# Patient Record
Sex: FEMALE | Race: WHITE | HISPANIC OR LATINO | Employment: OTHER | URBAN - METROPOLITAN AREA
[De-identification: names, ages, dates, MRNs, and addresses within clinical notes are randomized per-mention and may not be internally consistent; named-entity substitution may affect disease eponyms.]

---

## 2017-01-03 ENCOUNTER — HOSPITAL ENCOUNTER (INPATIENT)
Facility: HOSPITAL | Age: 82
LOS: 1 days | Discharge: HOME HEALTH | DRG: 684 | End: 2017-01-04
Attending: EMERGENCY MEDICINE | Admitting: INTERNAL MEDICINE
Payer: MEDICARE

## 2017-01-03 DIAGNOSIS — N17.9 AKI (ACUTE KIDNEY INJURY): ICD-10-CM

## 2017-01-03 DIAGNOSIS — R55 SYNCOPE, UNSPECIFIED SYNCOPE TYPE: Primary | ICD-10-CM

## 2017-01-03 DIAGNOSIS — F03.91 DEMENTIA WITH BEHAVIORAL DISTURBANCE, UNSPECIFIED DEMENTIA TYPE: ICD-10-CM

## 2017-01-03 DIAGNOSIS — R29.898 MUSCULAR DECONDITIONING: ICD-10-CM

## 2017-01-03 LAB
ALBUMIN SERPL BCP-MCNC: 3.6 G/DL
ALP SERPL-CCNC: 119 U/L
ALT SERPL W/O P-5'-P-CCNC: 14 U/L
AMORPH CRY URNS QL MICRO: NORMAL
ANION GAP SERPL CALC-SCNC: 12 MMOL/L
AST SERPL-CCNC: 19 U/L
BACTERIA #/AREA URNS HPF: NORMAL /HPF
BASOPHILS # BLD AUTO: 0.03 K/UL
BASOPHILS NFR BLD: 0.3 %
BILIRUB SERPL-MCNC: 1.2 MG/DL
BILIRUB UR QL STRIP: ABNORMAL
BNP SERPL-MCNC: 44 PG/ML
BUN SERPL-MCNC: 30 MG/DL
CALCIUM SERPL-MCNC: 9.8 MG/DL
CHLORIDE SERPL-SCNC: 103 MMOL/L
CK SERPL-CCNC: 44 U/L
CLARITY UR: CLEAR
CO2 SERPL-SCNC: 24 MMOL/L
COLOR UR: ABNORMAL
CREAT SERPL-MCNC: 3 MG/DL
DIFFERENTIAL METHOD: ABNORMAL
EOSINOPHIL # BLD AUTO: 0.1 K/UL
EOSINOPHIL NFR BLD: 0.9 %
ERYTHROCYTE [DISTWIDTH] IN BLOOD BY AUTOMATED COUNT: 13.4 %
EST. GFR  (AFRICAN AMERICAN): 15 ML/MIN/1.73 M^2
EST. GFR  (NON AFRICAN AMERICAN): 13 ML/MIN/1.73 M^2
GLUCOSE SERPL-MCNC: 183 MG/DL
GLUCOSE UR QL STRIP: NEGATIVE
HCT VFR BLD AUTO: 39.4 %
HGB BLD-MCNC: 13.3 G/DL
HGB UR QL STRIP: ABNORMAL
HYALINE CASTS #/AREA URNS LPF: 0 /LPF
INR PPP: 1
KETONES UR QL STRIP: ABNORMAL
LEUKOCYTE ESTERASE UR QL STRIP: NEGATIVE
LYMPHOCYTES # BLD AUTO: 2.8 K/UL
LYMPHOCYTES NFR BLD: 23.6 %
MAGNESIUM SERPL-MCNC: 2.3 MG/DL
MCH RBC QN AUTO: 30.5 PG
MCHC RBC AUTO-ENTMCNC: 33.8 %
MCV RBC AUTO: 90 FL
MICROSCOPIC COMMENT: NORMAL
MONOCYTES # BLD AUTO: 0.9 K/UL
MONOCYTES NFR BLD: 7.3 %
NEUTROPHILS # BLD AUTO: 7.9 K/UL
NEUTROPHILS NFR BLD: 67 %
NITRITE UR QL STRIP: NEGATIVE
PH UR STRIP: 5 [PH] (ref 5–8)
PLATELET # BLD AUTO: 241 K/UL
PMV BLD AUTO: 11.3 FL
POTASSIUM SERPL-SCNC: 4.2 MMOL/L
PROT SERPL-MCNC: 7.4 G/DL
PROT UR QL STRIP: ABNORMAL
PROTHROMBIN TIME: 11 SEC
RBC # BLD AUTO: 4.36 M/UL
RBC #/AREA URNS HPF: 2 /HPF (ref 0–4)
SODIUM SERPL-SCNC: 139 MMOL/L
SP GR UR STRIP: >=1.03 (ref 1–1.03)
SQUAMOUS #/AREA URNS HPF: NORMAL /HPF
T4 FREE SERPL-MCNC: 1.31 NG/DL
TROPONIN I SERPL DL<=0.01 NG/ML-MCNC: 0.01 NG/ML
TROPONIN I SERPL DL<=0.01 NG/ML-MCNC: <0.006 NG/ML
TSH SERPL DL<=0.005 MIU/L-ACNC: 4.61 UIU/ML
URN SPEC COLLECT METH UR: ABNORMAL
UROBILINOGEN UR STRIP-ACNC: 1 EU/DL
WBC # BLD AUTO: 11.76 K/UL
WBC #/AREA URNS HPF: 2 /HPF (ref 0–5)

## 2017-01-03 PROCEDURE — 84540 ASSAY OF URINE/UREA-N: CPT

## 2017-01-03 PROCEDURE — 85025 COMPLETE CBC W/AUTO DIFF WBC: CPT

## 2017-01-03 PROCEDURE — 85610 PROTHROMBIN TIME: CPT

## 2017-01-03 PROCEDURE — 11000001 HC ACUTE MED/SURG PRIVATE ROOM

## 2017-01-03 PROCEDURE — 84300 ASSAY OF URINE SODIUM: CPT

## 2017-01-03 PROCEDURE — 25000003 PHARM REV CODE 250: Performed by: EMERGENCY MEDICINE

## 2017-01-03 PROCEDURE — 93005 ELECTROCARDIOGRAM TRACING: CPT

## 2017-01-03 PROCEDURE — 82550 ASSAY OF CK (CPK): CPT

## 2017-01-03 PROCEDURE — 25000003 PHARM REV CODE 250: Performed by: INTERNAL MEDICINE

## 2017-01-03 PROCEDURE — 84484 ASSAY OF TROPONIN QUANT: CPT

## 2017-01-03 PROCEDURE — 84439 ASSAY OF FREE THYROXINE: CPT

## 2017-01-03 PROCEDURE — 99285 EMERGENCY DEPT VISIT HI MDM: CPT

## 2017-01-03 PROCEDURE — 96360 HYDRATION IV INFUSION INIT: CPT

## 2017-01-03 PROCEDURE — 82570 ASSAY OF URINE CREATININE: CPT

## 2017-01-03 PROCEDURE — 83880 ASSAY OF NATRIURETIC PEPTIDE: CPT

## 2017-01-03 PROCEDURE — 81000 URINALYSIS NONAUTO W/SCOPE: CPT

## 2017-01-03 PROCEDURE — 80053 COMPREHEN METABOLIC PANEL: CPT

## 2017-01-03 PROCEDURE — 36415 COLL VENOUS BLD VENIPUNCTURE: CPT

## 2017-01-03 PROCEDURE — 84484 ASSAY OF TROPONIN QUANT: CPT | Mod: 91

## 2017-01-03 PROCEDURE — 27000221 HC OXYGEN, UP TO 24 HOURS

## 2017-01-03 PROCEDURE — 84443 ASSAY THYROID STIM HORMONE: CPT

## 2017-01-03 PROCEDURE — 94761 N-INVAS EAR/PLS OXIMETRY MLT: CPT

## 2017-01-03 PROCEDURE — 83735 ASSAY OF MAGNESIUM: CPT

## 2017-01-03 RX ORDER — SODIUM CHLORIDE 9 MG/ML
INJECTION, SOLUTION INTRAVENOUS CONTINUOUS
Status: DISCONTINUED | OUTPATIENT
Start: 2017-01-03 | End: 2017-01-04 | Stop reason: HOSPADM

## 2017-01-03 RX ORDER — LACTULOSE 10 G/15ML
SOLUTION ORAL; RECTAL 3 TIMES DAILY
COMMUNITY
End: 2018-05-31

## 2017-01-03 RX ORDER — PANTOPRAZOLE SODIUM 40 MG/1
40 TABLET, DELAYED RELEASE ORAL DAILY
Status: DISCONTINUED | OUTPATIENT
Start: 2017-01-04 | End: 2017-01-04 | Stop reason: HOSPADM

## 2017-01-03 RX ORDER — HEPARIN SODIUM 5000 [USP'U]/ML
5000 INJECTION, SOLUTION INTRAVENOUS; SUBCUTANEOUS EVERY 12 HOURS
Status: DISCONTINUED | OUTPATIENT
Start: 2017-01-03 | End: 2017-01-04 | Stop reason: HOSPADM

## 2017-01-03 RX ADMIN — SODIUM CHLORIDE 1000 ML: 0.9 INJECTION, SOLUTION INTRAVENOUS at 03:01

## 2017-01-03 RX ADMIN — SODIUM CHLORIDE: 0.9 INJECTION, SOLUTION INTRAVENOUS at 08:01

## 2017-01-03 NOTE — ED NOTES
89 year old female presents to ed with chief complaint of loc. Patients family acts as historians and reports patient has hx of dementia. Patients daughter reports mother got up from toilet began to walk and felt dizzy resulting in syncopal episode. Patient denies any pain at this time

## 2017-01-03 NOTE — IP AVS SNAPSHOT
[facility]  [address]  [place] [address]  Phone: [phone]           Patient Discharge Instructions     Our goal is to set you up for success. This packet includes information on your condition, medications, and your home care. It will help you to care for yourself so you don't get sicker and need to go back to the hospital.     Please ask your nurse if you have any questions.        There are many details to remember when preparing to leave the hospital. Here is what you will need to do:    1. Take your medicine. If you are prescribed medications, review your Medication List in the following pages. You may have new medications to  at the pharmacy and others that you'll need to stop taking. Review the instructions for how and when to take your medications. Talk with your doctor or nurses if you are unsure of what to do.     2. Go to your follow-up appointments. Specific follow-up information is listed in the following pages. Your may be contacted by a transition nurse or clinical provider about future appointments. Be sure we have all of the phone numbers to reach you, if needed. Please contact your provider's office if you are unable to make an appointment.     3. Watch for warning signs. Your doctor or nurse will give you detailed warning signs to watch for and when to call for assistance. These instructions may also include educational information about your condition. If you experience any of warning signs to your health, call your doctor.               ** Verify the list of medication(s) below is accurate and up to date. Carry this with you in case of emergency. If your medications have changed, please notify your healthcare provider.             Medication List      CONTINUE taking these medications        Additional Info                      ibuprofen 200 MG tablet   Commonly known as:  ADVIL,MOTRIN   Refills:  0   Dose:  200 mg    Instructions:  Take 200 mg by mouth every 8  (eight) hours as needed for Pain.     Begin Date    AM    Noon    PM    Bedtime       lactulose 10 gram/15 mL solution   Commonly known as:  CHRONULAC   Refills:  0    Instructions:  Take by mouth 3 (three) times daily.     Begin Date    AM    Noon    PM    Bedtime       loratadine 10 mg tablet   Commonly known as:  CLARITIN   Refills:  0   Dose:  10 mg    Instructions:  Take 10 mg by mouth once daily.     Begin Date    AM    Noon    PM    Bedtime       omeprazole 40 MG capsule   Commonly known as:  PRILOSEC   Quantity:  90 capsule   Refills:  3   Dose:  40 mg    Instructions:  Take 1 capsule (40 mg total) by mouth before breakfast. 1 Capsule, Delayed Release(E.C.) Oral Every day.  30 minutes before breakfast     Begin Date    AM    Noon    PM    Bedtime         STOP taking these medications     alendronate 35 MG tablet   Commonly known as:  FOSAMAX                  Please bring to all follow up appointments:    1. A copy of your discharge instructions.  2. All medicines you are currently taking in their original bottles.  3. Identification and insurance card.    Please arrive 15 minutes ahead of scheduled appointment time.    Please call 24 hours in advance if you must reschedule your appointment and/or time.        Your Scheduled Appointments     Jan 23, 2017  2:40 PM CST   Established Patient Visit with Forest Angeles MD   [facility]    [address]  [place] [address]   [phone]            May 25, 2017  9:00 AM CDT   Established Patient Visit with Forest Angeles MD   [facility]    [address]  [place] [address]   [phone]              Follow-up Information     Follow up with Forest Angeles MD. Go on 1/23/2017.    Specialty:  Family Medicine    Why:  @2:40pm    Contact information:    [address] [place] [address]  [phone]        Referrals     Future Orders    Referral to Home health         Discharge  Instructions     Future Orders    Activity as tolerated     Call MD for:  difficulty breathing or increased cough     Call MD for:  increased confusion or weakness     Call MD for:  persistent dizziness, light-headedness, or visual disturbances     Call MD for:  persistent nausea and vomiting or diarrhea     Call MD for:  redness, tenderness, or signs of infection (pain, swelling, redness, odor or green/yellow discharge around incision site)     Call MD for:  severe persistent headache     Call MD for:  severe uncontrolled pain     Call MD for:  temperature >100.4     Call MD for:  worsening rash     Diet general     Comments:    Pureed and soft food.    Questions:    Total calories:      Fat restriction, if any:      Protein restriction, if any:      Na restriction, if any:      Fluid restriction:      Additional restrictions:          Discharge Instructions       DEHYDRATION (ADULT) (ENGLISH) View Edit Remove  DEHYDRATION (ENGLISH) View Edit Remove  DEHYDRATION, PREVENTING (CHILD) (ENGLISH) View Edit Remove  DEMENTIA PATIENTS, DAILY CARE FOR: FOR CAREGIVERS (ENGLISH) View Edit Remove  DEMENTIA PATIENTS, SAFETY TIPS FOR: FOR CAREGIVERS (ENGLISH) View Edit Remove  DEMENTIA, ANY TYPE, CAREGIVER SUPPORT (ENGLISH) View Edit Remove  DEMENTIA: COPING TIPS FOR CAREGIVERS (ENGLISH) View Edit Remove  HEART FAILURE, DISCHARGE INSTRUCTIONS FOR (ENGLISH) View Edit Remove  HEART FAILURE, WHAT IS (ENGLISH) View Edit Remove  HEART FAILURE: PROCEDURES THAT MAY HELP (ENGLISH) View Edit Remove  HEART FAILURE: TAKING MEDICATION TO CONTROL (ENGLISH) View Edit Remove  HEART FAILURE: TRACKING YOUR WEIGHT (ENGLISH) View Edit Remove  HEART FAILURE: WARNING SIGNS OF A FLARE-UP (ENGLISH) View Edit Remove        Discharge References/Attachments     DEHYDRATION (ADULT) (ENGLISH)    DEHYDRATION (ENGLISH)    DEHYDRATION, PREVENTING (CHILD) (ENGLISH)    DEMENTIA PATIENTS, DAILY CARE FOR: FOR CAREGIVERS (ENGLISH)    DEMENTIA PATIENTS, SAFETY TIPS  "FOR: FOR CAREGIVERS (ENGLISH)    DEMENTIA, ANY TYPE, CAREGIVER SUPPORT (ENGLISH)    DEMENTIA: COPING TIPS FOR CAREGIVERS (ENGLISH)    HEART FAILURE, DISCHARGE INSTRUCTIONS FOR (ENGLISH)    HEART FAILURE, WHAT IS (ENGLISH)    HEART FAILURE: PROCEDURES THAT MAY HELP (ENGLISH)    HEART FAILURE: TAKING MEDICATION TO CONTROL  (ENGLISH)    HEART FAILURE: TRACKING YOUR WEIGHT (ENGLISH)    HEART FAILURE: WARNING SIGNS OF A FLARE-UP (ENGLISH)        Primary Diagnosis     Your primary diagnosis was:  Acute Nontraumatic Kidney Injury      Admission Information     Date & Time Provider Department CSN    1/3/2017  1:35 PM Jeremy Wall MD [facility] [id number]      Care Providers     Provider Role Specialty Primary office phone    Jeremy Wall MD Attending Provider Internal Medicine [phone]      Your Vitals Were     BP Pulse Temp Resp Height Weight    131/84 (BP Location: Right arm, Patient Position: Lying, BP Method: Automatic) 78 99.1 °F (37.3 °C) (Axillary) 18 4' 5" (1.346 m) 59 kg (130 lb)    SpO2 BMI             98% 32.54 kg/m2         Recent Lab Values     No lab values to display.      Pending Labs     Order Current Status    Creatinine, urine, random In process    Sodium, urine, random In process    Urea nitrogen, urine In process      Allergies as of 1/4/2017        Reactions    Penicillins     Other reaction(s): Rash      [facility]     [facility] Nurse Care Line - 24/7 Assistance  Unless otherwise directed by your provider, please contact [facility], our nurse care line that is available for 24/7 assistance.     Registered nurses in the [facility] provide clinical advisement, health education, appointment booking, and other advisory services.  Call for this free service at [phone].        Advance Directives     An advance directive is a document which, in the event you are no longer able to make decisions for yourself, tells your healthcare team what " kind of treatment you do or do not want to receive, or who you would like to make those decisions for you.  If you do not currently have an advance directive, [facility] encourages you to create one.  For more information call:  [phone]), [phone] ([phone]),  or log on to [email or web address].        Language Assistance Services     ATTENTION: Language assistance services are available, free of charge. Please call [phone].      ATENCIÓN: Si habla español, tiene a vale disposición servicios gratuitos de asistencia lingüística. Llame al [phone].     CHÚ Ý: N?u b?n nói Ti?ng Vi?t, có các d?ch v? h? tr? ngôn ng? mi?n phí dành cho b?n. G?i s? [phone].        [facility] Sign-Up     Activating your [facility] account is as easy as 1-2-3!     1) Visit [email or web address], select Sign Up Now, enter this activation code and your date of birth, then select Next.  [email or web address]  Expires: 1/9/2017 10:02 AM      2) Create a username and password to use when you visit [facility] in the future and select a security question in case you lose your password and select Next.    3) Enter your e-mail address and click Sign Up!    Additional Information  If you have questions, please e-mail [email or web address] or call [phone] to talk to our [facility] staff. Remember, [facility] is NOT to be used for urgent needs. For medical emergencies, dial 911.          [facility] complies with applicable Federal civil rights laws and does not discriminate on the basis of race, color, national origin, age, disability, or sex.

## 2017-01-03 NOTE — ED NOTES
APPEARANCE: Alert, oriented and in no acute distress.     PERIPHERAL VASCULAR: peripheral pulses present. Normal cap refill. No edema. Warm to touch.    RESPIRATORY:Normal rate and effort, breath sounds clear bilaterally throughout chest. Respirations are equal and unlabored no obvious signs of distress.  GASTRO: soft, bowel sounds normal, no tenderness, no abdominal distention.  MUSC: Full ROM. No bony tenderness or soft tissue tenderness. No obvious deformity.  SKIN: Skin is warm and dry, normal skin turgor, mucous membranes moist.  MENTAL STATUS: awake, alert and aware of environment.  EYE: PERRL, both eyes: pupils brisk and reactive to light. Normal size.  ENT: EARS: no obvious drainage. NOSE: no active bleeding.

## 2017-01-03 NOTE — IP AVS SNAPSHOT
[facility]  [address]  [place] [address]  Phone: [phone]           I have received a copy of my After Visit Summary and discharge instructions from [facility].    INSTRUCTIONS RECEIVED AND UNDERSTOOD BY:                     Patient/Patient Representative: ________________________________________________________________     Date/Time: ________________________________________________________________                     Instructions Given By: ________________________________________________________________     Date/Time: ________________________________________________________________

## 2017-01-03 NOTE — ED PROVIDER NOTES
Encounter Date: 1/3/2017       History     Chief Complaint   Patient presents with    Loss of Consciousness     s/p vagal response while attempting to have bowel movement; family states loc occurred while in sons arms; denies injury; hx of dementia;      Review of patient's allergies indicates:   Allergen Reactions    Penicillins      Other reaction(s): Rash     HPI Comments: Patient is an 89-year-old female brought in by family members who say the patient had a syncopal episode while being walked back from the bathroom.  She was caught by a family member and did not fall to the floor.  They're unsure as to how long she was out for.  She did not stop breathing.  Patient awoke at her usual time this morning and had breakfast.  She had no complaints prior to this occurrence.  She has no prior history of syncopal episodes.    History provided by: family members.     Past Medical History   Diagnosis Date    DEMENTIA     Hypertension     Osteoporosis      No past medical history pertinent negatives.  History reviewed. No pertinent past surgical history.  Family History   Problem Relation Age of Onset    Melanoma Neg Hx      Social History   Substance Use Topics    Smoking status: Never Smoker    Smokeless tobacco: Never Used    Alcohol use No     Review of Systems   Constitutional: Negative for fever.   Respiratory: Negative for shortness of breath.    Gastrointestinal: Negative for abdominal pain, diarrhea and vomiting.   Neurological: Positive for syncope.   All other systems reviewed and are negative.      Physical Exam   Initial Vitals   BP Pulse Resp Temp SpO2   01/03/17 1333 01/03/17 1333 01/03/17 1333 01/03/17 1402 01/03/17 1333   126/96 70 16 98.7 °F (37.1 °C) 99 %     Physical Exam    Nursing note and vitals reviewed.  Constitutional: No distress.   HENT:   Head: Normocephalic and atraumatic.   Eyes: EOM are normal. Pupils are equal, round, and reactive to light.   Neck: Neck supple.   Cardiovascular:    MAGGIE.   Pulmonary/Chest: Breath sounds normal.   Abdominal: Soft. She exhibits no distension. There is no tenderness.   Musculoskeletal: Normal range of motion.   Neurological: She is alert.   Skin: Skin is warm and dry.         ED Course   Procedures  Labs Reviewed   CBC W/ AUTO DIFFERENTIAL - Abnormal; Notable for the following:        Result Value    Gran # 7.9 (*)     All other components within normal limits   COMPREHENSIVE METABOLIC PANEL - Abnormal; Notable for the following:     Glucose 183 (*)     BUN, Bld 30 (*)     Creatinine 3.0 (*)     Total Bilirubin 1.2 (*)     eGFR if  15 (*)     eGFR if non  13 (*)     All other components within normal limits   TSH - Abnormal; Notable for the following:     TSH 4.614 (*)     All other components within normal limits   CK   TROPONIN I   PROTIME-INR   B-TYPE NATRIURETIC PEPTIDE   MAGNESIUM   T4, FREE   URINALYSIS   URINALYSIS MICROSCOPIC     Imaging Results         X-Ray Chest 1 View (Final result) Result time:  01/03/17 16:34:01    Final result by Travis Byers MD (01/03/17 16:34:01)    Impression:        No acute cardiothoracic disease.      Electronically signed by: TRAVIS BYERS MD  Date:     01/03/17  Time:    16:34     Narrative:    PORTABLE AP CHEST:      Comparison: None.    Findings:     The lungs are clear. There is no pleural effusion or pneumothorax. The cardiac silhouette isnormal in size. Atherosclerotic calcification and ectasia of the aortic arch.  There are no acute bony abnormalities.            CT Head Without Contrast (Final result) Result time:  01/03/17 15:06:40    Final result by Travis Byers MD (01/03/17 15:06:40)    Impression:       No acute intracranial abnormality.    Parenchymal volume loss and sequelae of chronic small vessel disease, slightly progressed from prior exam.      Electronically signed by: TRAVIS BYERS MD  Date:     01/03/17  Time:    15:06     Narrative:    CT head without  contrast    Clinical indication: Syncope    Comparison: 6/3/11    Technique: 5-mm axial images of the head were performed without the administration of contrast.  Sagittal and coronal reformatted images were also obtained.    Findings:  No intracranial blood products.  No evidence for acute large territory infarction. No evidence of parenchymal mass.  No extra-axial masses or abnormal fluid collections are noted.    The diffuse parenchymal volume loss with ex vacuo ventricular dilatation is again noted, slightly progressed from prior. [place] periventricular and subcortical white matter hypodensities are noted bilaterally, sequelae of chronic small vessel disease. Symmetric bilateral basal ganglia and pontine calcifications are again noted and are unchanged.    The visualized paranasal sinuses and mastoid air cells are clear.  The osseous structures show no evidence of fracture.  The surrounding soft tissues are unremarkable.                EKG Readings: (Independently Interpreted)   Rhythm: Normal Sinus Rhythm. Heart Rate: 76. Ectopy: No Ectopy. Conduction: Normal. ST Segments: Normal ST Segments. T Waves: Normal. Clinical Impression: Normal Sinus Rhythm                            ED Course     Clinical Impression:   The primary encounter diagnosis was Syncope, unspecified syncope type. A diagnosis of ANA (acute kidney injury) was also pertinent to this visit.          Yadiel Carter MD  01/03/17 [id number]

## 2017-01-04 VITALS
DIASTOLIC BLOOD PRESSURE: 84 MMHG | BODY MASS INDEX: 30.09 KG/M2 | SYSTOLIC BLOOD PRESSURE: 131 MMHG | HEIGHT: 55 IN | OXYGEN SATURATION: 98 % | HEART RATE: 78 BPM | WEIGHT: 130 LBS | RESPIRATION RATE: 18 BRPM | TEMPERATURE: 99 F

## 2017-01-04 LAB
ALBUMIN SERPL BCP-MCNC: 3.1 G/DL
ALP SERPL-CCNC: 101 U/L
ALT SERPL W/O P-5'-P-CCNC: 13 U/L
ANION GAP SERPL CALC-SCNC: 9 MMOL/L
AORTIC VALVE REGURGITATION: ABNORMAL
AST SERPL-CCNC: 24 U/L
BASOPHILS # BLD AUTO: 0.02 K/UL
BASOPHILS NFR BLD: 0.3 %
BILIRUB SERPL-MCNC: 1.1 MG/DL
BUN SERPL-MCNC: 22 MG/DL
CALCIUM SERPL-MCNC: 8.9 MG/DL
CHLORIDE SERPL-SCNC: 108 MMOL/L
CO2 SERPL-SCNC: 21 MMOL/L
CREAT SERPL-MCNC: 1.2 MG/DL
CREAT UR-MCNC: 279.4 MG/DL
DIASTOLIC DYSFUNCTION: YES
DIFFERENTIAL METHOD: NORMAL
EOSINOPHIL # BLD AUTO: 0.1 K/UL
EOSINOPHIL NFR BLD: 1.1 %
ERYTHROCYTE [DISTWIDTH] IN BLOOD BY AUTOMATED COUNT: 13.5 %
EST. GFR  (AFRICAN AMERICAN): 46 ML/MIN/1.73 M^2
EST. GFR  (NON AFRICAN AMERICAN): 40 ML/MIN/1.73 M^2
ESTIMATED PA SYSTOLIC PRESSURE: 20.47
GLUCOSE SERPL-MCNC: 60 MG/DL
HCT VFR BLD AUTO: 37.8 %
HGB BLD-MCNC: 12.6 G/DL
LYMPHOCYTES # BLD AUTO: 2.2 K/UL
LYMPHOCYTES NFR BLD: 30.3 %
MCH RBC QN AUTO: 30 PG
MCHC RBC AUTO-ENTMCNC: 33.3 %
MCV RBC AUTO: 90 FL
MONOCYTES # BLD AUTO: 0.8 K/UL
MONOCYTES NFR BLD: 10.6 %
NEUTROPHILS # BLD AUTO: 4.2 K/UL
NEUTROPHILS NFR BLD: 57.3 %
PLATELET # BLD AUTO: 205 K/UL
PMV BLD AUTO: 11.4 FL
POTASSIUM SERPL-SCNC: 4.4 MMOL/L
PROT SERPL-MCNC: 7.2 G/DL
RBC # BLD AUTO: 4.2 M/UL
RETIRED EF AND QEF - SEE NOTES: 55 (ref 55–65)
SODIUM SERPL-SCNC: 138 MMOL/L
SODIUM UR-SCNC: 44 MMOL/L
TROPONIN I SERPL DL<=0.01 NG/ML-MCNC: 0.01 NG/ML
UUN UR-MCNC: 314 MG/DL
WBC # BLD AUTO: 7.29 K/UL

## 2017-01-04 PROCEDURE — 97165 OT EVAL LOW COMPLEX 30 MIN: CPT

## 2017-01-04 PROCEDURE — 94761 N-INVAS EAR/PLS OXIMETRY MLT: CPT

## 2017-01-04 PROCEDURE — 84484 ASSAY OF TROPONIN QUANT: CPT

## 2017-01-04 PROCEDURE — G8996 SWALLOW CURRENT STATUS: HCPCS | Mod: CI

## 2017-01-04 PROCEDURE — 97116 GAIT TRAINING THERAPY: CPT

## 2017-01-04 PROCEDURE — 80053 COMPREHEN METABOLIC PANEL: CPT

## 2017-01-04 PROCEDURE — 92610 EVALUATE SWALLOWING FUNCTION: CPT

## 2017-01-04 PROCEDURE — 97530 THERAPEUTIC ACTIVITIES: CPT

## 2017-01-04 PROCEDURE — G8988 SELF CARE GOAL STATUS: HCPCS | Mod: CN

## 2017-01-04 PROCEDURE — 94760 N-INVAS EAR/PLS OXIMETRY 1: CPT

## 2017-01-04 PROCEDURE — G8979 MOBILITY GOAL STATUS: HCPCS | Mod: CJ

## 2017-01-04 PROCEDURE — 25000003 PHARM REV CODE 250: Performed by: INTERNAL MEDICINE

## 2017-01-04 PROCEDURE — G8989 SELF CARE D/C STATUS: HCPCS | Mod: CN

## 2017-01-04 PROCEDURE — G8997 SWALLOW GOAL STATUS: HCPCS | Mod: CI

## 2017-01-04 PROCEDURE — 97161 PT EVAL LOW COMPLEX 20 MIN: CPT

## 2017-01-04 PROCEDURE — 85025 COMPLETE CBC W/AUTO DIFF WBC: CPT

## 2017-01-04 PROCEDURE — G8978 MOBILITY CURRENT STATUS: HCPCS | Mod: CK

## 2017-01-04 PROCEDURE — 96374 THER/PROPH/DIAG INJ IV PUSH: CPT

## 2017-01-04 PROCEDURE — G8998 SWALLOW D/C STATUS: HCPCS | Mod: CI

## 2017-01-04 PROCEDURE — G8987 SELF CARE CURRENT STATUS: HCPCS | Mod: CN

## 2017-01-04 RX ORDER — DOCUSATE SODIUM 100 MG/1
100 CAPSULE, LIQUID FILLED ORAL 2 TIMES DAILY
Status: DISCONTINUED | OUTPATIENT
Start: 2017-01-04 | End: 2017-01-04 | Stop reason: HOSPADM

## 2017-01-04 RX ADMIN — SODIUM CHLORIDE: 0.9 INJECTION, SOLUTION INTRAVENOUS at 05:01

## 2017-01-04 NOTE — PLAN OF CARE
Problem: Occupational Therapy Goal  Goal: Occupational Therapy Goal  Outcome: Outcome(s) achieved Date Met:  01/04/17  Evaluation completed.  OT recs home with family; OT HH evaluation for home assessment and recs; DME: TTB. No skilled acute OT required secondary to patient ADLs at baseline.

## 2017-01-04 NOTE — PLAN OF CARE
Patient discharged to home, discharge instruction given to the patient and the family member, verbalized understanding, refer to clinical reference for education, IV removed, tip intact, patient tolerated well, awaiting transport.

## 2017-01-04 NOTE — PLAN OF CARE
Problem: Patient Care Overview  Goal: Plan of Care Review  Outcome: Ongoing (interventions implemented as appropriate)  Pt on 1 lpm NC. SpO2 99%. Will continue to monitor.

## 2017-01-04 NOTE — PLAN OF CARE
Problem: Physical Therapy Goal  Goal: Physical Therapy Goal  Goals to be met by: 17     Patient will increase functional independence with mobility by performin. Supine to sit with Modified [place]  2. Sit to supine with Modified [place]  3. Sit to stand transfer with Modified [place] and Supervision  4. Gait x 100 feet with Stand-by Assistance without AD without LOB    Recommend discharge to return to home setting with 24 hr family caregivers and home health with PT and OT.  Outcome: Ongoing (interventions implemented as appropriate)  Recommend discharge to return to home setting with 24 hr family caregivers and home health with PT and OT.  Will continue to address pt's current deficits in endurance and balance with mobility tasks while in hospital with man cues/guidance.  Pt demonstrated decreased time in stance phase on LLE and pained grimace during gait training which family reports is a new symptom. Family reports of no known falls or injuries.  Reported to ns.

## 2017-01-04 NOTE — PLAN OF CARE
Patient arrived on unit via stretcher from ED. Moved to patient bed with four assist. Family at bedside. Will continue to monitor.

## 2017-01-04 NOTE — DISCHARGE INSTRUCTIONS
DEHYDRATION (ADULT) (ENGLISH) View Edit Remove  DEHYDRATION (ENGLISH) View Edit Remove  DEHYDRATION, PREVENTING (CHILD) (ENGLISH) View Edit Remove  DEMENTIA PATIENTS, DAILY CARE FOR: FOR CAREGIVERS (ENGLISH) View Edit Remove  DEMENTIA PATIENTS, SAFETY TIPS FOR: FOR CAREGIVERS (ENGLISH) View Edit Remove  DEMENTIA, ANY TYPE, CAREGIVER SUPPORT (ENGLISH) View Edit Remove  DEMENTIA: COPING TIPS FOR CAREGIVERS (ENGLISH) View Edit Remove  HEART FAILURE, DISCHARGE INSTRUCTIONS FOR (ENGLISH) View Edit Remove  HEART FAILURE, WHAT IS (ENGLISH) View Edit Remove  HEART FAILURE: PROCEDURES THAT MAY HELP (ENGLISH) View Edit Remove  HEART FAILURE: TAKING MEDICATION TO CONTROL (ENGLISH) View Edit Remove  HEART FAILURE: TRACKING YOUR WEIGHT (ENGLISH) View Edit Remove  HEART FAILURE: WARNING SIGNS OF A FLARE-UP (ENGLISH) View Edit Remove

## 2017-01-04 NOTE — PT/OT/SLP EVAL
"Occupational Therapy  Evaluation    Moira Salter   MRN: [id number]   Admitting Diagnosis: ANA    OT Date of Treatment: 17   OT Start Time: 1043  OT Stop Time: 1115  OT Total Time (min): 32 min    Billable Minutes:  Evaluation 32 (co-evaluation with PHENNA)    Diagnosis: ANA, syncopal episode      Past Medical History   Diagnosis Date    DEMENTIA     Hypertension     Osteoporosis       History reviewed. No pertinent past surgical history.    Referring physician: Dr. Araya  Date referred to OT: 1/3/2017    General Precautions: Standard, aspiration, fall  Orthopedic Precautions: N/A  Braces: N/A          Patient History:  Living Environment  Lives With: child(lisa), adult  Living Arrangements: house  Home Accessibility: stairs to enter home  Home Layout: Able to live on 1st floor  Number of Stairs to Enter Home: 5  Stair Railings at Home: outside, present at both sides  Transportation Available: car, family or friend will provide  Living Environment Comment:  (Patient requires assist with all ADLs and functional mobility secondary to patient did not initiate ADLs PTA except feeding secondary to advanced dementia.)  Equipment Currently Used at Home: none    Prior level of function:   Bed Mobility/Transfers: needs assist  Grooming: needs assist  Bathing: needs assist  Upper Body Dressing: needs assist  Lower Body Dressing: needs assist  Toileting: needs assist  Home Management Skills: needs assist  Homemaking Responsibilities: No  Driving License: No  Mode of Transportation: Car, Family     Dominant hand: right    Subjective:  Communicated with nurse prior to session.    Chief Complaint: Daughter states "decreased appetite"  Patient/Family stated goals: Increase food intake    Pain Ratin/10  Location - Side: Left     Location: hip  Pain Addressed: Cessation of Activity, Reposition  Pain Rating Post-Intervention: 0/10    Objective:  Patient found with: peripheral IV, telemetry, oxygen    Cognitive Exam:  Oriented " to: Person  Follows Commands/attention: Inattentive and Easily distracted  Communication: clear but inappropriate answers  Memory:  Impaired STM, Impaired LTM and Poor immediate recall  Safety awareness/insight to disability: impaired  Coping skills/emotional control: Appropriate to situation    Visual/perceptual:  Intact    Physical Exam:  Postural examination/scapula alignment: Head forward  Skin integrity: Thin  Edema: None noted     Sensation:   Intact    Upper Extremity Range of Motion:  Right Upper Extremity: WFL   Left Upper Extremity: WFL    Upper Extremity Strength:  Right Upper Extremity: WFL (not able to formal assessed but patient able to functional use UEs)  Left Upper Extremity: WFL (not able to formal assessed but patient able to functional use UEs)   Strength: Fair    Fine motor coordination:   Intact    Gross motor coordination: WFL    Functional Mobility:  Bed Mobility:  Scooting/Bridging: Minimum Assistance  Supine to Sit: Minimum Assistance, With assist of 2  Sit to Supine: Minimum Assistance, With assist of 2    Transfers:  Sit <> Stand Assistance: Minimum Assistance  Sit <> Stand Assistive Device: No Assistive Device    Functional Ambulation: Min A for LOB, and functional mobility.    Activities of Daily Living:  Feeding Level of Assistance: Total assistance  UE Dressing Level of Assistance: Total assistance  LE Dressing Level of Assistance: Total assistance     Grooming Level of Assistance: Total assistance  Toileting Where Assessed: Bed level  Toileting Level of Assistance: Total assistance        Balance:   Static Sit: GOOD+: Takes MAXIMAL challenges from all directions.    Dynamic Sit: GOOD-: Maintains balance through MODERATE excursions of active trunk movement,     Static Stand: FAIR: Maintains without assist but unable to take challenges  Dynamic stand: FAIR: Needs CONTACT GUARD during gait      AM-PAC 6 CLICK ADL  How much help from another person does this patient currently  "need?  1 = Unable, Total/Dependent Assistance  2 = A lot, Maximum/Moderate Assistance  3 = A little, Minimum/Contact Guard/Supervision  4 = None, Modified [place]/Independent    Putting on and taking off regular lower body clothing? : 1  Bathing (including washing, rinsing, drying)?: 1  Toileting, which includes using toilet, bedpan, or urinal? : 1  Putting on and taking off regular upper body clothing?: 1  Taking care of personal grooming such as brushing teeth?: 1  Eating meals?: 1  Total Score: 6    AM-PAC Raw Score CMS "G-Code Modifier Level of Impairment Assistance   6 % Total / Unable   7 - 9 CM 80 - 100% Maximal Assist   10 - 14 CL 60 - 80% Moderate Assist   15 - 19 CK 40 - 60% Moderate Assist   20 - 22 CJ 20 - 40% Minimal Assist   23 CI 1-20% SBA / CGA   24 CH 0% Independent/ Mod I       Patient left supine with all lines intact, call button in reach, bed alarm on and family present    Assessment:  Moira Salter is a 89 y.o. female with a medical diagnosis of ANA and presents with decreased balance, strength and pain affecting functional mobility.  Patient only engages in purposeful functional mobility with family, and feeding.  However, since patient has a decreased appetite she does not engage in feeding.  Patient requires total assist with ADLs which is close to her baseline.  AE, DME, and HH OT assessment recommended to decrease caregiver burden.    Rehab identified problem list/impairments: Rehab identified problem list/impairments: weakness, impaired self care skills, impaired functional mobilty, gait instability, impaired balance, impaired cognition, decreased safety awareness    Rehab potential is good.    Activity tolerance: Good    Discharge recommendations: Discharge Facility/Level Of Care Needs: home with home health     Barriers to discharge: Barriers to Discharge: None    Equipment recommendations: bath bench     GOALS:   Occupational Therapy Goals     Not on file    "   Multidisciplinary Problems (Resolved)        Problem: Occupational Therapy Goal    Goal Priority Disciplines Outcome Interventions   Occupational Therapy Goal   (Resolved)     OT, PT/OT Outcome(s) achieved              PLAN:  Patient to be seen  (NA) to address the above listed problems via    Plan of Care expires: 01/04/17  Plan of Care reviewed with: daughter, family    OT G-codes  Functional Assessment Tool Used: AM-PAC  Score: 6  Functional Limitation: Self care  Self Care Current Status (): MINE  Self Care Goal Status (): CN  Self Care Discharge Status (): MINE Gregg OT  01/04/2017

## 2017-01-04 NOTE — PLAN OF CARE
[facility] sent orders for HH and DME to N.  Future Appointments  Date Time Provider Department Center   1/23/2017 2:40 PM Forest Angeles MD [facility]   5/25/2017 9:00 AM Forest Angeles MD [facility] faxed HH orders to N, awaiting reply.     01/04/17 1526   Final Note   Assessment Type Final Discharge Note   Discharge Disposition Home-Health   Discharge planning education complete? Yes   What phone number can be called within the next 1-3 days to see how you are doing after discharge? [phone]   Hospital Follow Up  Appt(s) scheduled? Yes   Discharge plans and expectations educations in teach back method with documentation complete? Yes   Offered [facility] -- Bedside Delivery? n/a   Discharge/Hospital Encounter Summary to (non-[facility]) PCP n/a   Referral to Outpatient Case Management complete? No   Referral to / orders for Home Health Complete? Yes  (faxed to PHN.)   30 day supply of medicines given at discharge, if documented non-compliance / non-adherence? n/a   Any social issues identified prior to discharge? No   Did you assess the readiness or willingness of the family or caregiver to support self management of care? Yes   Right Care Referral Info   Post Acute Recommendation Home-care   Cezar Sandoval RN  Transitional Navigator/Case Management  [phone]

## 2017-01-04 NOTE — PLAN OF CARE
Problem: SLP Goal  Goal: SLP Goal  Short Term Goals:  1. Pt will participate in a bedside swallow study to determine the safest and least restrictive possible po diet with possible updated goals to follow pending results. -MET  Outcome: Outcome(s) achieved Date Met:  01/04/17  Swallow study completed. Pt presented with no overt s/s of aspiration with all consistencies trialed. Recommend: Pureed diet, thin liquids, crushed po medications, upright for all po intake, assistance with meals, small bites/sips, check for oral pocketing. Per daughter, pt at baseline for eating/drinking. No further skilled ST services warranted at this time. Please reconsult as needed.  LING Saleh., CCC-SLP  01/04/2017

## 2017-01-04 NOTE — PT/OT/SLP EVAL
Speech Language Pathology  Bedside Swallow Study  Evaluation/Discharge      Moira Salter   MRN: [id number]   [id number]    Admitting Diagnosis: <principal problem not specified>    Diet recommendations: Solid Diet Level: Puree  Liquid Diet Level: Thin Feed only when awake/alert, HOB to 90 degrees, Small bites/sips, Crushed po medications, Assistance with meals    SLP Treatment Date: 01/04/17  Speech Start Time: 0915     Speech Stop Time: 0930     Speech Total (min): 15 min       TREATMENT BILLABLE MINUTES:  Eval Swallow and Oral Function 15    Diagnosis: <principal problem not specified>     H&P: 88yo Portuguese speaking F w/ PMH Dementia and Constipation with complaint of Syncope x1 day. History was obtained from family due to patient's poor baseline mental status. Patient was in her usual state of health (walks without assistance, can feed herself, uses bathroom with assistance and diapers, help with medications from family) until around 2 weeks ago when she began to have decreased appetite with decreased PO food and water intake. Family states that she usually has a strong appetite and will feed herself, however over the past 2 weeks patient has refused to eat and her family must feed her. On day of admission, patient indicated she needed to use the bathroom, and required assistance by her family members to bring her to the bathroom. She was able to urinate and defecate, however when the patient reynaldo from the toilet began to feel light headed and had loss of balance. Her family members grabbed her and brought her to her bed where they noticed she had lost consciousness for less than 1 min. There was no loss bowel/bladder. There were not jerking movements witnessed by family. Patient denies fevers, chills, chest pain, palpitations, abdominal pain, constipation, nausea/vomiting. Admits to dizziness and lightheadedness.    CXR: The lungs are clear. There is no pleural effusion or pneumothorax. The cardiac silhouette  isnormal in size. Atherosclerotic calcification and ectasia of the aortic arch.  There are no acute bony abnormalities.    Past Medical History   Diagnosis Date    DEMENTIA     Hypertension     Osteoporosis      History reviewed. No pertinent past surgical history.    Has the patient been evaluated by SLP for swallowing? : Yes  Keep patient NPO?: No   General Precautions: Standard, pureed diet    Current Respiratory Status: nasal cannula     Social Hx: Patient lives with daughter.    Prior diet: pureed diet, rice, finely chopped noodles, thin liquids, crushed po medicatio ns.    Subjective:  Pt repositioned in bed. Pt Peruvian speaking. Daughter present in room throughout evaluation.   Patient goals: non stated.    Pain Ratin/10  Pain Rating Post-Intervention: 0/10    Objective:      Oral Musculature Evaluation  Oral Musculature: general weakness  Dentition: edentulous, rarely or never uses dentures to eat  Mucosal Quality: adequate  Oral Labial Strength and Mobility: impaired seal     Bedside Swallow Eval:  Assessed with:  THIN:- ice chip x1; self regulated sip of milk via straw x4  PUREE:- tsp bite of pudding x3  DENTAL SOFT:-tsp bite of peaches x1      Swallow study completed. Pt does not like water per daughter and refused water trials. Pt accepted milk trials via straw. Pt does not follow commands. Pt presented with no overt s/s of aspiration with all consistencies trialed. Slow oral transit time and mild residue noted with single peach bite trial Pt presented with oral dysphagia with solid consistencies. Pt's daughter reported pt typically eats pureed foods, rice, and finely chopped noodles. Per daughter, pt is at baseline functioning for eating/drinking. Recommend: Pureed diet, thin liquids, crushed po medications, upright for all po intake, assistance with meals, small bites/sips, check for oral pocketing. No further skilled ST services warranted at this time. Please reconsult as needed    FIM:  Social  Interaction: 2 Maximal Direction--The patient interacts appropriately 25 to 49% of the time, but may beed restraint due to socially inappropriate behaviors.                            Assessment:  Moira Salter is a 89 y.o. female with a medical diagnosis of <principal problem not specified> and presents with no overt s/s of aspiration. No further skilled ST services warranted at this time. Please reconsult as needed.           Discharge recommendations: Discharge Facility/Level Of Care Needs:  (no skilled ST services warranted; see PT/OT recs)     Goals:   SLP Goals     Not on file      Multidisciplinary Problems (Resolved)        Problem: SLP Goal    Goal Priority Disciplines Outcome   SLP Goal   (Resolved)     SLP Outcome(s) achieved   Description:  Short Term Goals:  1. Pt will participate in a bedside swallow study to determine the safest and least restrictive possible po diet with possible updated goals to follow pending results. -MET                 Plan:   Patient to be seen     Plan of Care expires:    Plan of Care reviewed with: patient, daughter  SLP Follow-up?: No         SLP G-Codes  Functional Assessment Tool Used: noms  Score: 6  Functional Limitations: Swallowing  Swallow Current Status (): CI  Swallow Goal Status (): CI  Swallow Discharge Status (): ANNA Camarena CCC-SLP  01/04/2017

## 2017-01-04 NOTE — PLAN OF CARE
Problem: Patient Care Overview  Goal: Plan of Care Review  Outcome: Ongoing (interventions implemented as appropriate)  Plan of care reviewed with patient. Patient's daughter has acted as . Patient's daughter verbalized understanding. Daughter has remained at bedside throughout shift. IV fluids maintained per MD orders. Patient NSR on telemetry monitoring, HR 60's-70's, with no ectopy noted. Bed is low and locked, bed alarm is activated, call light is within reach. Patient and family have been instructed to call if in need of assistance. Verbalize understanding. Will continue to monitor.

## 2017-01-04 NOTE — PT/OT/SLP EVAL
Physical Therapy  Evaluation    Moira Salter   MRN: [id number]   Admitting Diagnosis: dementia, syncope    PT Received On: 01/04/17  PT Start Time: 1037     PT Stop Time: 1117    PT Total Time (min): 40 min       Billable Minutes:  Evaluation 15, Gait [id number] and Therapeutic Activity 10    Diagnosis:  Dementia, syncope    Past Medical History   Diagnosis Date    DEMENTIA     Hypertension     Osteoporosis       History reviewed. No pertinent past surgical history.    Family reports pt with decreased PO intake over past few days and syncopal episode where son caught her (no fall to floor) after using bathroom and ambulating to return to bedroom.  General Precautions: Standard, fall (Poor safety awareness)  Orthopedic Precautions: N/A   Braces: N/A       Do you have any cultural, spiritual, Buddhist conflicts, given your current situation?: none reported by family    Patient History:  Lives With: child(lisa), adult (Pt lives with daughter as primary caregiver, brother, son in law and adult grandson)  Living Arrangements: house  Home Accessibility: stairs to enter home  Home Layout: Able to live on 1st floor  Number of Stairs to Enter Home: 5  Stair Railings at Home: outside, present at both sides  Living Environment Comment: Pt lives with multiple family members who assist in her care but daughter Trice is primary caregiver currently.  Pt is incontinent and wears diapers.  Pt is independently mobile but has poor safety awareness due to dementia.  Equipment Currently Used at Home: none  DME owned (not currently used): none    Previous Level of Function:  Ambulation Skills: independent (Pt requires supervision for safety 2* dementia but was independent with mobility per family/caregiver report.)  Transfer Skills: independent  ADL Skills: needs assist  Work/Leisure Activity: needs assist    Subjective:  Communicated with nsg prior to session.  Pt is Turkmen speaking.  Pt with hx of dementia.  Multiple family members  present speaking Mozambican w pt and reporting she is speaking nonsensical things and not answering any questions or following instructions appropriately.  Chief Complaint: no c/o verbalized.  Grimaced during txf sup to sit and during gait training avoided full wt bearing stance phase with LLE  Patient goals: family wants to return to home caring for pt as prior to admit    Pain Ratin/10         Location:  (Pt unable to report pain but did grimace with txf sup to sit initially and then demonstrated antalgic gait of LLE decreased stance time though no verbal c/o issued)          Objective:   Patient found with: oxygen, telemetry, peripheral IV, bed alarm     Cognitive Exam:  Oriented to: not person, place or time    Follows Commands/attention: Does not follow command;  Easily distracted  Communication: clear/fluent Mozambican but not appropriate words/conversations  Safety awareness/insight to disability: impaired    Physical Exam:  Postural examination/scapula alignment: Rounded shoulder, Head forward and Scoliosis    Skin integrity: Thin    Upper Extremity Range of Motion:  Right Upper Extremity: WFL  Left Upper Extremity: WFL    Upper Extremity Strength:   Observed functionally- unable to MMT  Right Upper Extremity: WFL  Left Upper Extremity: WFL    Lower Extremity Range of Motion:  Right Lower Extremity: WFL  Left Lower Extremity: WFL    Lower Extremity Strength:   Observed > 3/5 functionally - unable to MMT due to pt's dementia levels  Right Lower Extremity: WFL  Left Lower Extremity: WFL       Functional Mobility:  Bed Mobility:  Rolling/Turning to Left: Moderate assistance  Rolling/Turning Right: Moderate assistance  Scooting/Bridging: With assist of 2, Maximum Assistance  Supine to Sit: Minimum Assistance (pt unable to follow commands so physical assist req'd for cues for movement)  Sit to Supine: With assist of  2, Maximum Assistance, Moderate Assistance    Transfers:  Sit <> Stand Assistance: Minimum  Assistance  Sit <> Stand Assistive Device: No Assistive Device (HHA x 2 for safety with lines and balance)    Gait:   Gait Distance: pt amb ~30', and ~14' with single and B HHA with min assist for balance, assist with IV req'd, pt with decreased kaylan, decreased time in LLE stance phase, narrow FANY, LOB with change in direction with no attempt to self corrrect noted, guided by man cues as pt does not follow verbal cues in English or Bengali.  Assistance 1: Minimum assistance  Gait Assistive Device: Hand held assist  Gait Pattern: swing-to gait  Gait Deviation(s): decreased kaylan, increased time in double stance, decreased swing-to-stance ratio, decreased toe-to-floor clearance, decreased velocity of limb motion, decreased step length, decreased stride length        Balance:   Static Sit: FAIR: Maintains without assist, but unable to take any challenges   Dynamic Sit: FAIR: Cannot move trunk without losing balance  Static Stand: POOR+: Needs MINIMAL assist to maintain  Dynamic stand: POOR: N/A    Therapeutic Activities and Exercises:  Extensive education for family/caregivers with equipment available, home safety, txf and care training and safety, back care, importance of keeping pt active but awareness of current decreased balance and increased assistance that will be req'd by caregivers, bed mobility training, txf training, gait training, sit EOB balance     AM-PAC 6 CLICK MOBILITY  How much help from another person does this patient currently need?   1 = Unable, Total/Dependent Assistance  2 = A lot, Maximum/Moderate Assistance  3 = A little, Minimum/Contact Guard/Supervision  4 = None, Modified [place]/Independent    Turning over in bed (including adjusting bedclothes, sheets and blankets)?: 2  Sitting down on and standing up from a chair with arms (e.g., wheelchair, bedside commode, etc.): 3  Moving from lying on back to sitting on the side of the bed?: 2  Moving to and from a bed to a chair (including  a wheelchair)?: 3  Need to walk in hospital room?: 3  Climbing 3-5 steps with a railing?: 2  Total Score: 15     AM-PAC Raw Score CMS G-Code Modifier Level of Impairment Assistance   6 % Total / Unable   7 - 9 CM 80 - 100% Maximal Assist   10 - 14 CL 60 - 80% Moderate Assist   15 - 19 CK 40 - 60% Moderate Assist   20 - 22 CJ 20 - 40% Minimal Assist   23 CI 1-20% SBA / CGA   24 CH 0% Independent/ Mod I     Patient left supine with all lines intact, call button in reach, bed alarm on, nsg notified and family members present.    Assessment:   Moira Salter is a 89 y.o. female with a medical diagnosis of dementia and syncope and presents with decreased balance and independence levels with functional mobility compared to premorbid levels.  Pt requires 24 hr care for safety due to dementia.      Rehab identified problem list/impairments: Rehab identified problem list/impairments: weakness, impaired endurance, impaired self care skills, impaired functional mobilty, impaired cognition, pain, decreased safety awareness, impaired balance, gait instability, decreased lower extremity function    Rehab potential is fair to Good.    Pt's family members report pt at times agitated and difficult to follow instructions.  However during evaluation, pt cooperative with physical cues and guidance provided.  Will continue with rehab services as long as pt continues with positive participation as demonstrated today.    Activity tolerance: Good    Discharge recommendations: Discharge Facility/Level Of Care Needs: home with home health, home health PT, home health OT     Barriers to discharge: Barriers to Discharge: None    Equipment recommendations: Equipment Needed After Discharge: hospital bed     GOALS:   Physical Therapy Goals        Problem: Physical Therapy Goal    Goal Priority Disciplines Outcome Goal Variances Interventions   Physical Therapy Goal     PT/OT, PT Ongoing (interventions implemented as appropriate)      Description:  Goals to be met by: 17     Patient will increase functional independence with mobility by performin. Supine to sit with Modified [place]  2. Sit to supine with Modified [place]  3. Sit to stand transfer with Modified [place] and Supervision  4. Gait  x 100 feet with Stand-by Assistance without AD without LOB    Recommend discharge to return to home setting with 24 hr family caregivers and home health with PT and OT.                PLAN:    Patient to be seen 5 x/week to address the above listed problems via gait training, therapeutic activities, therapeutic exercises  Plan of Care expires: 17  Plan of Care reviewed with: patient, caregiver, daughter    Functional Assessment Tool Used: [facility]  Score: 15  Functional Limitation: Mobility: Walking and moving around  Mobility: Walking and Moving Around Current Status (): CK  Mobility: Walking and Moving Around Goal Status (): ROSA ISELA Brunson, PT  2017

## 2017-01-04 NOTE — PHYSICIAN QUERY
"PT Name: Moira Salter  MR #: [id number]  Physician Query Form - Nutrition Clarification   Reviewer  Ext [phone] [place]    This form is a permanent document in the medical record.     Query Date: January 4, 2017  By submitting this query, we are merely seeking further clarification of documentation.. Please utilize your independent clinical judgment when addressing the question(s) below.    The Medical record reflects the following:   Indicators     Supporting Clinical Findings Location in Medical Record   X Wt/ BMI/ Usual Body Weight BMI=32.6 Anthropometrics 1/3   X Alb          TProt            Pre-Albumin Alb=3.1-3.6  TProt=7.2-7.4 LAB 1/3-1/4   X Acute or Chronic illness PMH Dementia, GERD, Constipation with complaint of Syncope x1 day  H&P 1/3   X % of estimated energy intake over a time frame from p.o., TF or TPN Diet NPO  Orders    Weight status over a time frame      Subcutaneous fat and/or muscle loss      Reduced  strength     X "Delayed wound healing:, "Failure to thrive" Failure to Thrive  - As per family, hx of 2 weeks of decreased PO Intake on baseline dementia H&P 1/3    "Fluid accumulation" or "Edema"      "Hypoalbuminemia"      Other:        AND / ASPEN Clinical Characteristics (October 2011)  A minimum of two characteristics is recommended for diagnosing either moderate or severe malnutrition    Mild Malnutrition Moderate Malnutrition Severe Malnutrition    Energy Intake from p.o., TF or TPN. < 75% intake of estimated energy needs for less than 7 days. < 75% intake of estimated energy needs for greater than 7 days. < 50% intake of estimated energy needs for > 5 days   Weight Loss 1-2% in 1 month  5% in 3 months  7.5% in 6 months  10% in one year 1-2 % in 1 week  5% in 1 month  7.5% in 3 months  10% in 6 months  20% in 1 year > 2% in 1 week  > 5% in 1 month  >7.5% in 3 months  >10% in 6 months  >20% in 1 year   Physical Findings None *Mild subcutaneous fat and/or muscle loss  *Mild fluid " accumulation  *Stage II decubitus  *Surgical wound or non-healing wound *Mod subcutaneous fat and/or muscle loss  *Mod/severe fluid accumulation  *Stage III or IV decubitus  *Non-healing surgical wound     Provider, please specify the diagnosis or diagnoses associated with above clinical findings.                                [ ] Mild Protein-Calorie Malnutrition  [ ] Moderate Protein-Calorie Malnutrition  [ ] Severe Protein-CalorieMalnutrition  [ ] Cachexia  [ ] Anorexia  [ ] Other Nutritional Diagnosis (Specify) ____________________________________  [ x ] Clinically Undetermined    Please document in your progress notes daily for the duration of treatment, until resolved, and include in your discharge summary.

## 2017-01-04 NOTE — PLAN OF CARE
Problem: Patient Care Overview  Goal: Plan of Care Review  Outcome: Ongoing (interventions implemented as appropriate)  Patient is [place] citizen speaking only and is confused. Plan of care reviewed with the patient's daughter who remains at bedside. 2D echo and US done today, fluids still infusing at 100cc/hr, no other distress noted, will continue to monitor. No true red alarms or ectopy noted, will continue to monitor.

## 2017-01-04 NOTE — PLAN OF CARE
Future Appointments  Date Time Provider Department Center   1/23/2017 2:40 PM Forest Angeles MD [facility] [place]   5/25/2017 9:00 AM Forest Angeles MD [facility] [place]        01/04/17 1056   Discharge Assessment   Assessment Type Discharge Planning Assessment   Confirmed/corrected address and phone number on facesheet? Yes   Assessment information obtained from? Caregiver   Expected Length of Stay (days) 2   Communicated expected length of stay with patient/caregiver yes   Prior to hospitilization cognitive status: Not Oriented to Place;Not Oriented to Time   Prior to hospitalization functional status: Needs Assistance   Current cognitive status: Not Oriented to Place;Not Oriented to Time   Current Functional Status: Needs Assistance   Arrived From home or self-care   Lives With child(lisa), adult   Able to Return to Prior Arrangements yes   Is patient able to care for self after discharge? Yes   How many people do you have in your home that can help with your care after discharge? 2   Who are your caregiver(s) and their phone number(s)? Adrianna Crews Daughter     [phone]     Patient's perception of discharge disposition home or selfcare   Readmission Within The Last 30 Days no previous admission in last 30 days   Patient currently being followed by outpatient case management? No   Patient currently receives home health services? No   Does the patient currently use HME? No   Patient currently receives private duty nursing? No   Patient currently receives any other outside agency services? No   Equipment Currently Used at Home none   Do you have any problems affording any of your prescribed medications? No   Is the patient taking medications as prescribed? yes   Do you have any financial concerns preventing you from receiving the healthcare you need? No   Does the patient have transportation to healthcare appointments? Yes   Transportation Available family or friend will provide    On Dialysis? No   Does the patient receive services at the [facility]? No   Discharge Plan A Home with family   Discharge Plan B Home with family;Home Health   Patient/Family In Agreement With Plan yes   Cezar Sandoval RN  Transitional Navigator/Case Management  [phone]

## 2017-01-04 NOTE — H&P
\[facility]\"" Internal Medicine History and Physical - Resident Note    Admitting Team: \[facility]\"" Internal Medicine Team B  Attending Physician: Dr. Wall  Resident: Marshal  Interns: Neptali    Date of Admit: 1/3/2017    Chief Complaint     Syncope x1 day    Subjective:      History of Present Illness:    88yo Ma[place] speaking F w/ PMH Dementia and Constipation with complaint of Syncope x1 day. History was obtained from family due to patient's poor baseline mental status. Patient was in her usual state of health (walks without assistance, can feed herself, uses bathroom with assistance and diapers, help with medications from family) until around 2 weeks ago when she began to have decreased appetite with decreased PO food and water intake. Family states that she usually has a strong appetite and will feed herself, however over the past 2 weeks patient has refused to eat and her family must feed her. On day of admission, patient indicated she needed to use the bathroom, and required assistance by her family members to bring her to the bathroom. She was able to urinate and defecate, however when the patient reynaldo from the toilet began to feel light headed and had loss of balance. Her family members grabbed her and brought her to her bed where they noticed she had lost consciousness for less than 1 min. There was no loss bowel/bladder. There were not jerking movements witnessed by family. Patient denies fevers, chills, chest pain, palpitations, abdominal pain, constipation, nausea/vomiting. Admits to dizziness and lightheadedness.      Past Medical History:  Past Medical History   Diagnosis Date    DEMENTIA     Hypertension     Osteoporosis        Past Surgical History:  History reviewed. No pertinent past surgical history.    Allergies:  Review of patient's allergies indicates:   Allergen Reactions    Penicillins      Other reaction(s): Rash       Home Medications:  Prior to Admission medications    Medication Sig Start Date  "End Date Taking? Authorizing Provider   lactulose (CHRONULAC) 10 gram/15 mL solution Take by mouth 3 (three) times daily.   Yes Historical Provider, MD   ibuprofen (ADVIL,MOTRIN) 200 MG tablet Take 200 mg by mouth every 8 (eight) hours as needed for Pain.    Historical Provider, MD   loratadine (CLARITIN) 10 mg tablet Take 10 mg by mouth once daily.    Historical Provider, MD   omeprazole (PRILOSEC) 40 MG capsule Take 1 capsule (40 mg total) by mouth before breakfast. 1 Capsule, Delayed Release(E.C.) Oral Every day.  30 minutes before breakfast 3/10/16   Forest Angeles MD   alendronate (FOSAMAX) 35 MG tablet Take 1 tablet (35 mg total) by mouth every 7 days. 11/25/16 1/3/17  Forest Angeles MD       Family History:  Family History   Problem Relation Age of Onset    Melanoma Neg Hx        Social History:  Social History   Substance Use Topics    Smoking status: Never Smoker    Smokeless tobacco: Never Used    Alcohol use No       Review of Systems:  Pertinent positives and negatives listed in HPI. All other systems are reviewed and are negative.    Health Maintaince :   Primary Care Physician: Forest Angeles MD       Objective:   Last 24 Hour Vital Signs:  BP  Min: 124/62  Max: 138/77  Temp  Av.7 °F (37.1 °C)  Min: 98.7 °F (37.1 °C)  Max: 98.7 °F (37.1 °C)  Pulse  Av.6  Min: 66  Max: 88  Resp  Av.8  Min: 11  Max: 16  SpO2  Av.4 %  Min: 89 %  Max: 100 %  Height  Av' 5" (134.6 cm)  Min: 4' 5" (134.6 cm)  Max: 4' 5" (134.6 cm)  Weight  Av kg (130 lb)  Min: 59 kg (130 lb)  Max: 59 kg (130 lb)  Body mass index is 32.54 kg/(m^2).       Physical Examination:  General: Alert and awake in no apparent distress  Head:  Normocephalic and atraumatic  Eyes:  PERRL; EOMi with anicteric sclera and clear conjunctivae  Mouth:  Oropharynx clear and without exudate; moist mucous membranes  Cardio:  Regular rate and rhythm with normal S1 and S2; no murmurs or rubs  Resp:  CTAB; " respirations unlabored; no wheezes, crackles or rhonchi  Abdom: Soft, NTND with normoactive bowel sounds  Extrem: Warm and well-perfused with no clubbing, cyanosis or edema  Skin:  No rashes, lesions, or color changes  Pulses: 2+ and symmetric distally  Neuro:  AAOx3; cooperative and pleasant with no focal deficits    Laboratory:  Most Recent Data:  CBC: Lab Results   Component Value Date    WBC 11.76 01/03/2017    HGB 13.3 01/03/2017    HCT 39.4 01/03/2017     01/03/2017    MCV 90 01/03/2017    RDW 13.4 01/03/2017     BMP: Lab Results   Component Value Date     01/03/2017    K 4.2 01/03/2017     01/03/2017    CO2 24 01/03/2017    BUN 30 (H) 01/03/2017    CREATININE 3.0 (H) 01/03/2017     (H) 01/03/2017    CALCIUM 9.8 01/03/2017    MG 2.3 01/03/2017     LFTs: Lab Results   Component Value Date    PROT 7.4 01/03/2017    ALBUMIN 3.6 01/03/2017    BILITOT 1.2 (H) 01/03/2017    AST 19 01/03/2017    ALKPHOS 119 01/03/2017    ALT 14 01/03/2017     Coags:   Lab Results   Component Value Date    INR 1.0 01/03/2017     FLP: Lab Results   Component Value Date    CHOL 217 (H) 11/25/2016    HDL 44 11/25/2016    LDLCALC 144.0 11/25/2016    TRIG 145 11/25/2016    CHOLHDL 20.3 11/25/2016     DM: Lab Results   Component Value Date    LDLCALC 144.0 11/25/2016    CREATININE 3.0 (H) 01/03/2017     Thyroid: Lab Results   Component Value Date    TSH 4.614 (H) 01/03/2017    FREET4 1.31 01/03/2017    M7JSPBS 7.0 03/31/2010    X5RMKHT 91 03/31/2010     Anemia: Lab Results   Component Value Date    FHXXOGGQ42 564 01/17/2012    FOLATE 12.0 01/17/2012     Cardiac: Lab Results   Component Value Date    TROPONINI 0.009 01/03/2017    BNP 44 01/03/2017     Urinalysis: Lab Results   Component Value Date    COLORU Orange (A) 01/03/2017    SPECGRAV >=1.030 (A) 01/03/2017    NITRITE Negative 01/03/2017    KETONESU Trace (A) 01/03/2017    UROBILINOGEN 1.0 01/03/2017    WBCUA 2 01/03/2017       Trended Cardiac Data:    Recent  Labs  Lab 01/03/17  1432   TROPONINI 0.009   BNP 44       Other Results:  EKG (my interpretation): 1/3/16 EKG: Normal sinus rhythm, poor R-wave progression, borderline L axis deviation (unknown baseline EKG)    Radiology:  Imaging Results         X-Ray Chest 1 View (Final result) Result time:  01/03/17 16:34:01    Final result by Travis Byers MD (01/03/17 16:34:01)    Impression:        No acute cardiothoracic disease.      Electronically signed by: TRAVIS BYERS MD  Date:     01/03/17  Time:    16:34     Narrative:    PORTABLE AP CHEST:      Comparison: None.    Findings:     The lungs are clear. There is no pleural effusion or pneumothorax. The cardiac silhouette isnormal in size. Atherosclerotic calcification and ectasia of the aortic arch.  There are no acute bony abnormalities.            CT Head Without Contrast (Final result) Result time:  01/03/17 15:06:40    Final result by Travis Byers MD (01/03/17 15:06:40)    Impression:       No acute intracranial abnormality.    Parenchymal volume loss and sequelae of chronic small vessel disease, slightly progressed from prior exam.      Electronically signed by: TRAVIS BYERS MD  Date:     01/03/17  Time:    15:06     Narrative:    CT head without contrast    Clinical indication: Syncope    Comparison: 6/3/11    Technique: 5-mm axial images of the head were performed without the administration of contrast.  Sagittal and coronal reformatted images were also obtained.    Findings:  No intracranial blood products.  No evidence for acute large territory infarction. No evidence of parenchymal mass.  No extra-axial masses or abnormal fluid collections are noted.    The diffuse parenchymal volume loss with ex vacuo ventricular dilatation is again noted, slightly progressed from prior. Clyde periventricular and subcortical white matter hypodensities are noted bilaterally, sequelae of chronic small vessel disease. Symmetric bilateral basal ganglia and  pontine calcifications are again noted and are unchanged.    The visualized paranasal sinuses and mastoid air cells are clear.  The osseous structures show no evidence of fracture.  The surrounding soft tissues are unremarkable.                 Assessment:     Moira Salter is a 89 y.o. female with:  Patient Active Problem List    Diagnosis Date Noted    Syncope 01/03/2017    Dementia 07/24/2012    GERD (gastroesophageal reflux disease) 07/24/2012    Hypertension 07/24/2012    Depression 07/24/2012        Plan:     88yo [place]de[place] speaking F w/ PMH Dementia, GERD, Constipation with complaint of Syncope x1 day presents with    Syncope  - Secondary to orthostatic hypotension due to dehydration secondary to poor PO intake  - On day of admission, 1x episode dizziness, lightheadedness, and syncope after sitting-to-standing from toilet  - As per family, 2 weeks poor PO intake food and water.  - In ED patient positive orthostatics by criteria (Sitting /83, Standing /62)  -  1/3/17 CT Head: Parenchymal volume loss and sequelae of chronic small vessel disease, slightly progressed from prior exam  - Will continue IVF resuscitation,  - F/u SLP recommendation for PO water/nutrition    ANA on CKD3  - Likely due todehydration secondary to poor PO intake  - At admission, Cr 3.0 (baseline 1.0)  - F/u urine labs  - F/u renal US  - Will continue IVF resustitation, f/u SLP recommendation for PO water/nutrition  - Monitor daily BMP    GERD  - Currently asymptomatic  - Will continue home PPI    Constipation  - Controlled with lactulose at home  - Will hold lactulose now in setting of ANA and IVF resusitation    Failure to Thrive  - As per family, hx of 2 weeks of decreased PO Intake on baseline dementia  - Patient requiring increased assistance with ADL's including going to bathroom, eating, and ambulating  - F/u PT/OT/SLP recommendations  - Encourage PO food intake    Dementia, unspecified type  - Currently, no acute  issues  - Continue outpatient treatment       DVT PPx: Heparin  Diet: NPO pending SLP eval  Code: Full    Disposition: Pending clinical improvement      Familia Gunderson  [facility] Internal Medicine HO-I  [facility] Internal Medicine Service    [facility] Medicine Hospitalist Pager numbers:   [facility] Hospitalist Medicine Team A (Pop/Codey): [phone]  [facility] Hospitalist Medicine Team B (Caryn/Mae):  [phone]

## 2017-01-04 NOTE — PLAN OF CARE
Problem: Patient Care Overview  Goal: Plan of Care Review  Pt on RA SPO2 98%.  No apparent distress.  Will continue to monitor.

## 2017-01-04 NOTE — PROGRESS NOTES
"[facility] Internal Medicine Resident HO-I Progress Note    Subjective:      Patient state she feels well this morning. Denies dizziness, lightheadedness, fevers chills, chest pain.     Objective:   Last 24 Hour Vital Signs:  BP  Min: 124/62  Max: 146/88  Temp  Av.3 °F (36.8 °C)  Min: 97.8 °F (36.6 °C)  Max: 98.7 °F (37.1 °C)  Pulse  Av.3  Min: 66  Max: 88  Resp  Av.2  Min: 11  Max: 16  SpO2  Av.5 %  Min: 75 %  Max: 100 %  Height  Av' 5" (134.6 cm)  Min: 4' 5" (134.6 cm)  Max: 4' 5" (134.6 cm)  Weight  Av kg (130 lb)  Min: 59 kg (130 lb)  Max: 59 kg (130 lb)  I/O last 3 completed shifts:  In: 875 [I.V.:875]  Out: -     Physical Examination:  Last 24 Hour Vital Signs:  BP  Min: 124/62  Max: 146/88  Temp  Av.3 °F (36.8 °C)  Min: 97.8 °F (36.6 °C)  Max: 98.7 °F (37.1 °C)  Pulse  Av.3  Min: 66  Max: 88  Resp  Av.2  Min: 11  Max: 16  SpO2  Av.5 %  Min: 75 %  Max: 100 %  Height  Av' 5" (134.6 cm)  Min: 4' 5" (134.6 cm)  Max: 4' 5" (134.6 cm)  Weight  Av kg (130 lb)  Min: 59 kg (130 lb)  Max: 59 kg (130 lb)  Body mass index is 32.54 kg/(m^2).  I/O last 3 completed shifts:  In: 875 [I.V.:875]  Out: -     General: Alert and awake in no apparent distress  Head:  Normocephalic and atraumatic  Eyes:  PERRL; EOMi with anicteric sclera and clear conjunctivae  Mouth:  Oropharynx clear and without exudate; moist mucous membranes  Cardio:  Regular rate and rhythm with normal S1 and S2; no murmurs or rubs  Resp:  CTAB; respirations unlabored; no wheezes, crackles or rhonchi  Abdom: Soft, NTND with normoactive bowel sounds  Extrem: Warm and well-perfused with no clubbing, cyanosis or edema  Skin:  No rashes, lesions, or color changes  Pulses: 2+ and symmetric distally  Neuro:  Cooperative with commands and pleasant with no focal deficits    Laboratory:  Laboratory Data  Pertinent Findings:  Recent Labs      17   1432   WBC  11.76   HGB  13.3   HCT  39.4   PLT  241   MCV  90 "   RDW  13.4   GRAN  67.0  7.9*   LYMPH  23.6  2.8   MONO  7.3  0.9   EOS  0.1   BASO  0.03   EOSINOPHIL  0.9   BASOPHIL  0.3       Recent Labs      01/03/17   1432   NA  139   K  4.2   CL  103   CO2  24   BUN  30*   CREATININE  3.0*     No results for input(s): POCTGLUCOSE in the last 72 hours.  Recent Labs      01/03/17   1432   PROT  7.4   ALBUMIN  3.6   BILITOT  1.2*   AST  19   ALT  14   ALKPHOS  119       Microbiology Data  Pertinent Findings:  No new data    Other Results:  EKG (my interpretation): No new EKG    Radiology Data   Pertinent Findings (my interpretation):  No new imaging    Current Medications:     Infusions:   sodium chloride 0.9% 100 mL/hr at 01/04/17 0528        Scheduled:   heparin (porcine)  5,000 Units Subcutaneous Q12H    pantoprazole  40 mg Oral Daily        PRN:      Antibiotics and Day Number of Therapy:  none    Lines and Day Number of Therapy:  PIV 18G x1    Assessment:     Moira Salter is a 89 y.o.female with  Patient Active Problem List    Diagnosis Date Noted    Syncope 01/03/2017    Dementia 07/24/2012    GERD (gastroesophageal reflux disease) 07/24/2012    Hypertension 07/24/2012    Depression 07/24/2012        Plan:     88yo Bahraini speaking F w/ PM Dementia, GERD, Constipation with complaint of Syncope x1 day presents with     Syncope  - Secondary to orthostatic hypotension due to dehydration secondary to poor PO intake  - On day of admission, 1x episode dizziness, lightheadedness, and syncope after sitting-to-standing from toilet  - As per family, 2 weeks poor PO intake food and water.  - In ED patient positive orthostatics by criteria (Sitting /83, Standing /62)  - 1/3/17 CT Head: Parenchymal volume loss and sequelae of chronic small vessel disease, slightly progressed from prior exam  - Will continue IVF resuscitation,  - F/u SLP recommendation for PO water/nutrition     ANA on CKD3  - Likely due todehydration secondary to poor PO intake  - At  admission, Cr 3.0 (baseline 1.0)  - F/u renal US  - FeNa = 0.3% (pre-renal)  - Will continue IVF resustitation, f/u SLP recommendation for PO water/nutrition  - Monitor daily BMP     GERD  - Currently asymptomatic  - Will continue home PPI     Constipation  - Controlled with lactulose at home  - Continue colace  - Will hold lactulose now in setting of ANA and IVF resusitation     Failure to Thrive  - As per family, hx of 2 weeks of decreased PO Intake on baseline dementia  - Patient requiring increased assistance with ADL's including going to bathroom, eating, and ambulating  - F/u PT/OT/SLP recommendations  - Encourage PO food intake     Dementia, unspecified type  - Currently, no acute issues  - Continue outpatient treatment         DVT PPx: Heparin  Diet: NPO pending SLP eval  Code: Full     Disposition: Pending clinical improvement        Familia Gunderson  [facility] Internal Medicine HO-I  [facility] Internal Medicine Service     [facility] Medicine Hospitalist Pager numbers:   [facility] Hospitalist Medicine Team A (Pop/Codey): [phone]  [facility] Hospitalist Medicine Team B (Caryn/Mae):  [phone]

## 2017-01-05 ENCOUNTER — PATIENT OUTREACH (OUTPATIENT)
Dept: ADMINISTRATIVE | Facility: CLINIC | Age: 82
End: 2017-01-05
Payer: MEDICARE

## 2017-01-05 NOTE — DISCHARGE SUMMARY
[facility] Discharge Summary    Primary Team: [facility] Team B  Attending Physician: Dr. Jeremy Wall  Resident: Marshal  Intern: Neptali    Date of Admit: 1/3/2017  Date of Discharge: 1/5/2017    Discharge to: Home with Home Health PT/OT  Condition: Stable for discharge    Discharge Diagnoses     Patient Active Problem List   Diagnosis    Dementia    GERD (gastroesophageal reflux disease)    Hypertension    Depression    Syncope    ANA (acute kidney injury)       Consultants and Procedures     Consultants:  None    Procedures:   None    Brief History of Present Illness      88yo Amharic speaking F w/ PMH Dementia and Constipation with complaint of Syncope x1 day. History was obtained from family due to patient's poor baseline mental status. Patient was in her usual state of health (walks without assistance, can feed herself, uses bathroom with assistance and diapers, help with medications from family) until around 2 weeks ago when she began to have decreased appetite with decreased PO food and water intake. Family states that she usually has a strong appetite and will feed herself, however over the past 2 weeks patient has refused to eat and her family must feed her. On day of admission, patient indicated she needed to use the bathroom, and required assistance by her family members to bring her to the bathroom. She was able to urinate and defecate, however when the patient reynaldo from the toilet began to feel light headed and had loss of balance. Her family members grabbed her and brought her to her bed where they noticed she had lost consciousness for less than 1 min. There was no loss bowel/bladder. There were not jerking movements witnessed by family. Patient denies fevers, chills, chest pain, palpitations, abdominal pain, constipation, nausea/vomiting. Admits to dizziness and lightheadedness.    For the full HPI please refer to the History & Physical from this admission.    [facility]  Course By Problem with Pertinent Findings     89yo Kinyarwanda speaking F w/ PMH Dementia, GERD, Constipation with complaint of Syncope x1 day presents with     Syncope  - Secondary to orthostatic hypotension due to dehydration secondary to poor PO intake  - On day of admission, 1x episode dizziness, lightheadedness, and syncope after sitting-to-standing from toilet  - As per family, 2 weeks poor PO intake food and water.  - In ED patient positive orthostatics by criteria (Sitting /83, Standing /62)  - 1/3/17 CT Head: Parenchymal volume loss and sequelae of chronic small vessel disease, slightly progressed from prior exam  - Continued IVF resuscitation,        ANA on CKD3, ANA Resolved  - Likely due todehydration secondary to poor PO intake  - At admission, Cr 3.0 (baseline 1.0)  - 1/3/16 Renal US: Medical renal disease.  - FeNa = 0.3% (pre-renal)  - Will continue IVF resustitation, f/u SLP recommendation for PO water/nutrition      GERD  - Currently asymptomatic  - Will continue home PPI      Constipation  - Controlled with lactulose at home  - Continued colace  - Held home lactulose in setting of ANA and IVF resusitation      Deconditioning  - As per family, hx of 2 weeks of decreased PO Intake on baseline dementia  - Patient requiring increased assistance with ADL's including going to bathroom, eating, and ambulating  - PT/OT recommendations: Home health w/ PT/OT  - SLP Recommendations: Pureed diet, thin liquids, crushed po medications, upright for all po intake, assistance with meals, small bites/sips, check for oral pocketing  - Encourage PO food intake      Dementia, unspecified type  - Currently, no acute issues  - Continue outpatient treatment         Discharge Medications        Medication List      CONTINUE taking these medications          ibuprofen 200 MG tablet   Commonly known as:  ADVIL,MOTRIN       lactulose 10 gram/15 mL solution   Commonly known as:  CHRONULAC       loratadine 10 mg tablet    Commonly known as:  CLARITIN       omeprazole 40 MG capsule   Commonly known as:  PRILOSEC   Take 1 capsule (40 mg total) by mouth before breakfast. 1 Capsule, Delayed Release(E.C.) Oral Every day.  30 minutes before breakfast         STOP taking these medications          alendronate 35 MG tablet   Commonly known as:  FOSAMAX             Discharge Information:   Diet:  Regular diet as per SLP recommendations with nutritional supplimentation    Physical Activity:  As tollerated    Instructions:  1. Take all medications as prescribed  2. Keep all follow-up appointments  3. Return to the hospital or call your primary care physicians if any worsening symptoms such as syncope, fever,chest pain, abdominal pain, headache, dizziness, lightheadedness, or as needed.    Follow-Up Appointments:  Follow-up Information     Follow up with Forest Angeles MD. Go on 1/23/2017.    Specialty:  Family Medicine    Why:  @2:40pm    Contact information:    [address] [place] [address]  [phone]            Familia Gunderson  [facility], -

## 2017-01-05 NOTE — PROGRESS NOTES
C3 nurse attempted to contact patient. No answer. The following message was left for the patient to return the call:  Good morning  I am a nurse calling on behalf of [facility] from the [facility].  This is a Transitional Care Call for Moira Salter. When you have a moment please contact us at [phone] or [phone] Monday through Friday, between the hours of 8 am to 4 pm. We look forward to speaking with you. On behalf of [facility] have a nice day.    The patient has a scheduled HOSFU appointment with Forest Angeles MD ON Jan 23, 2017  @ 2:40 PM. Message sent to Physician staff.

## 2017-01-05 NOTE — PATIENT INSTRUCTIONS
Discharge Instructions for Acute Kidney Injury  You have been diagnosed with acute kidney injury. This means that your kidneys are not working properly. When both kidneys are healthy, they help filter out fluid and waste from the blood and body. Acute kidney injury has many causes including urinary blockages, infection, lack of enough blood supply, and medications that can injure kidneys. In some cases, acute kidney injury is temporary, lasting several days to a few months, because the kidney has the capacity to repair itself. But, it may also result in chronic kidney disease or end stage renal failure. Here are some instructions for you to follow as you recover.  Home care  · Follow any instructions for eating and drinking given to you by your doctor.  ¨ Drink less fluid, if instructed by your doctor.  ¨ Keep a record of everything you eat and drink.  · Measure the amount of urine and stool you have each day.  · Weigh yourself every day, at the same time of day, and in the same kind of clothes. Keep a daily record of your daily weights.  · Take your temperature every day. Keep a record of the results.  · Learn to take your own blood pressure. Keep a record of your results. Ask your doctor when you should seek emergency medical attention. He or she will tell you which blood pressure reading is dangerous.  · Avoid contact with people who have infections (colds, bronchitis, or skin conditions).  · Practice good personal hygiene. This is especially important if you have a catheter in place when you leave the hospital. Doing so helps keep you safe from infection.  · Take your medications exactly as directed.  · You may require frequent blood and urine tests to monitor your kidney function.  Follow-up care  Make a follow-up appointment as directed by our staff.     When to seek medical care  Call your doctor right away if you have any of the following:  · Signs of bladder infection (urinating more often than usual;  burning, pain, bleeding, or hesitancy when you urinate)  · Signs of infection around your catheter (redness, swelling, warmth, or drainage)  · Rapid weight loss or gain, such as 3 pounds or more in 24 hours or 6 pounds or more in 7 days  · Fever above 100.4°F (38.0°C) or chills  · Muscle aches  · Night sweats  · Very little or no urine output  · Swelling of your hands, legs, or feet  · Back pain  · Abdominal pain  · Extreme tiredness   © 2850-2834 [employer]. [address] [place] [address]. All rights reserved. This information is not intended as a substitute for professional medical care. Always follow your healthcare professional's instructions.

## 2017-01-11 ENCOUNTER — HOSPITAL ENCOUNTER (OUTPATIENT)
Dept: RADIOLOGY | Facility: HOSPITAL | Age: 82
Discharge: HOME | End: 2017-01-11
Attending: FAMILY MEDICINE
Payer: MEDICARE

## 2017-01-11 ENCOUNTER — TELEPHONE (OUTPATIENT)
Dept: FAMILY MEDICINE | Facility: CLINIC | Age: 82
End: 2017-01-11

## 2017-01-11 ENCOUNTER — OFFICE VISIT (OUTPATIENT)
Dept: FAMILY MEDICINE | Facility: CLINIC | Age: 82
End: 2017-01-11
Payer: MEDICARE

## 2017-01-11 VITALS
BODY MASS INDEX: 26.22 KG/M2 | DIASTOLIC BLOOD PRESSURE: 64 MMHG | SYSTOLIC BLOOD PRESSURE: 118 MMHG | OXYGEN SATURATION: 89 % | HEIGHT: 55 IN | TEMPERATURE: 97 F | HEART RATE: 103 BPM | WEIGHT: 113.31 LBS

## 2017-01-11 DIAGNOSIS — R50.9 FEVER, UNSPECIFIED FEVER CAUSE: ICD-10-CM

## 2017-01-11 DIAGNOSIS — I10 ESSENTIAL HYPERTENSION: ICD-10-CM

## 2017-01-11 DIAGNOSIS — J02.9 PHARYNGITIS, UNSPECIFIED ETIOLOGY: ICD-10-CM

## 2017-01-11 DIAGNOSIS — F03.91 DEMENTIA WITH BEHAVIORAL DISTURBANCE, UNSPECIFIED DEMENTIA TYPE: Primary | ICD-10-CM

## 2017-01-11 DIAGNOSIS — D72.829 LEUKOCYTOSIS, UNSPECIFIED TYPE: Primary | ICD-10-CM

## 2017-01-11 DIAGNOSIS — N18.5 CKD (CHRONIC KIDNEY DISEASE) STAGE 5, GFR LESS THAN 15 ML/MIN: ICD-10-CM

## 2017-01-11 DIAGNOSIS — F33.1 MODERATE EPISODE OF RECURRENT MAJOR DEPRESSIVE DISORDER: ICD-10-CM

## 2017-01-11 DIAGNOSIS — R05.9 COUGH: ICD-10-CM

## 2017-01-11 PROCEDURE — 1157F ADVNC CARE PLAN IN RCRD: CPT | Mod: S$GLB,,, | Performed by: FAMILY MEDICINE

## 2017-01-11 PROCEDURE — 71020 XR CHEST PA AND LATERAL: CPT | Mod: 26,,, | Performed by: RADIOLOGY

## 2017-01-11 PROCEDURE — 1160F RVW MEDS BY RX/DR IN RCRD: CPT | Mod: S$GLB,,, | Performed by: FAMILY MEDICINE

## 2017-01-11 PROCEDURE — 99999 PR PBB SHADOW E&M-EST. PATIENT-LVL III: CPT | Mod: PBBFAC,,, | Performed by: FAMILY MEDICINE

## 2017-01-11 PROCEDURE — 71020 XR CHEST PA AND LATERAL: CPT | Mod: TC

## 2017-01-11 PROCEDURE — 99499 UNLISTED E&M SERVICE: CPT | Mod: S$GLB,,, | Performed by: FAMILY MEDICINE

## 2017-01-11 PROCEDURE — 99214 OFFICE O/P EST MOD 30 MIN: CPT | Mod: S$GLB,,, | Performed by: FAMILY MEDICINE

## 2017-01-11 PROCEDURE — 1159F MED LIST DOCD IN RCRD: CPT | Mod: S$GLB,,, | Performed by: FAMILY MEDICINE

## 2017-01-11 PROCEDURE — 1125F AMNT PAIN NOTED PAIN PRSNT: CPT | Mod: S$GLB,,, | Performed by: FAMILY MEDICINE

## 2017-01-11 RX ORDER — AZITHROMYCIN 250 MG/1
TABLET, FILM COATED ORAL
Qty: 6 TABLET | Refills: 0 | Status: SHIPPED | OUTPATIENT
Start: 2017-01-11 | End: 2017-01-16

## 2017-01-11 NOTE — PROGRESS NOTES
Subjective:       Patient ID: Moira Salter is a 89 y.o. female.    Chief Complaint: Hospital Follow Up; Fever; Cough; URI; Sore Throat; and Headache    HPI Comments: 89 years old female who came to the clinic with fever and sore throat for the last week.  Patient was recently evaluated at the emergency room secondary to syncope and acute renal failure.  Patient currently is not drinking too much fluids or eating.    Fever    Associated symptoms include coughing, headaches and a sore throat.   Cough   Associated symptoms include a fever, headaches and a sore throat.   URI    Associated symptoms include coughing, headaches and a sore throat.   Sore Throat    Associated symptoms include coughing and headaches.   Headache    Associated symptoms include coughing, a fever and a sore throat.     Review of Systems   Constitutional: Positive for fever.   HENT: Positive for sore throat.    Eyes: Negative.    Respiratory: Positive for cough.    Cardiovascular: Negative.    Gastrointestinal: Negative.    Genitourinary: Negative.    Musculoskeletal: Negative.    Skin: Negative.    Neurological: Positive for headaches.   Psychiatric/Behavioral: Negative.        Objective:      Physical Exam   Constitutional: She appears lethargic. She appears cachectic. She is uncooperative. She has a sickly appearance. No distress.   HENT:   Head: Normocephalic and atraumatic.   Right Ear: External ear normal.   Left Ear: External ear normal.   Nose: Nose normal.   Mouth/Throat: Posterior oropharyngeal erythema present. No oropharyngeal exudate.   Eyes: Conjunctivae and EOM are normal. Pupils are equal, round, and reactive to light. Right eye exhibits no discharge. Left eye exhibits no discharge. No scleral icterus.   Neck: Normal range of motion. Neck supple. No JVD present. No tracheal deviation present. No thyromegaly present.   Cardiovascular: Normal rate, regular rhythm, normal heart sounds and intact distal pulses.  Exam reveals no gallop  and no friction rub.    No murmur heard.  Pulmonary/Chest: Effort normal and breath sounds normal. No stridor. No respiratory distress. She has no wheezes. She has no rales. She exhibits no tenderness.   Abdominal: Soft. Bowel sounds are normal. She exhibits no distension and no mass. There is no tenderness. There is no rebound and no guarding.   Musculoskeletal: Normal range of motion. She exhibits no edema or tenderness.   Lymphadenopathy:     She has no cervical adenopathy.   Neurological: She has normal reflexes. She appears lethargic. No cranial nerve deficit. She exhibits normal muscle tone. Coordination and gait abnormal.   Skin: Skin is warm and dry. No rash noted. She is not diaphoretic. No erythema. No pallor.   Psychiatric: Her behavior is normal. Judgment and thought content normal. Her mood appears anxious. Her affect is not angry, not blunt, not labile and not inappropriate. Cognition and memory are impaired. She exhibits a depressed mood. She exhibits abnormal recent memory and abnormal remote memory.       Assessment:       1. Dementia with behavioral disturbance, unspecified dementia type    2. Moderate episode of recurrent major depressive disorder    3. Fever, unspecified fever cause    4. CKD (chronic kidney disease) stage 5, GFR less than 15 ml/min    5. Essential hypertension    6. Pharyngitis, unspecified etiology        Plan:     Moira MONTOYA was seen today for hospital follow up, fever, cough, uri, sore throat and headache.    Diagnoses and all orders for this visit:    Dementia with behavioral disturbance, unspecified dementia type    Moderate episode of recurrent major depressive disorder    Fever, unspecified fever cause  -     X-Ray Chest PA And Lateral; Future    CKD (chronic kidney disease) stage 5, GFR less than 15 ml/min  -     Ambulatory referral to Nephrology  -     CBC auto differential; Future  -     Renal function panel; Future    Essential hypertension    Pharyngitis, unspecified  etiology  -     azithromycin (Z-COLETTE) 250 MG tablet; Take 2 tablets by mouth on day 1; Take 1 tablet by mouth on days 2-5        Continue monitoring blood pressure at home, low sodium diet.

## 2017-01-11 NOTE — PATIENT INSTRUCTIONS
For Caregivers: Daily Care for Dementia Patients  Over time, people with dementia will need more and more help with daily tasks. These include eating meals, taking medicines, and getting enough exercise. They also include personal care needs, such as bathing and dressing. To reduce stress, make these activities part of a routine. Ask family and friends to lend a hand. And be aware that your loved ones abilities can change from day to day. If you have problems meeting your loved ones needs, its time to get help. Talk to a  or local support agency--such as a local Alzheimers Association chapter.      Gardening can be a pleasant way to keep your loved one active.   Activity and exercise  Regular activity is good for your loved ones body and mind. It may even help slow the progression of the disease. Keep to your loved ones old routines when possible. It also helps to:  · Do things together. Go for a walk, garden, or bake a cake. Basic, repetitive activities are good choices.  · Be active as often as possible. This releases pent-up energy, which can reduce restlessness and improve sleep.  · Include social activities. Take your loved one to see friends and family. But try to keep things simple. Loud noises, crowds, or too many people talking at once can be upsetting.  Taking medicines  Be sure all prescribed medicines are taken as directed. These tips can help:  · Provide supervision if your loved one cannot safely take medicines alone.  · Set a routine so medicines are taken at the same time each day.  · Ensure that ALL medicines are taken. A pillbox can help you keep track.  · Plan ahead. Be sure to refill prescriptions before they run out.  Eating meals  At mealtime, serve healthy foods with plenty of fluids. These tips can also help:  · Keep meals simple. Too many choices can be overwhelming. Try to maintain a calm, quiet atmosphere while you eat.  · Place healthy snacks, such as fresh fruit, out  where they can be seen.  · Watch eating habits. People with dementia may eat too little or too much. Talk to the healthcare provider if you have concerns.  · Try finger foods if regular meals become too difficult for your loved one to eat.  Dressing  People with dementia may have trouble choosing what to wear. Its OK if clothes dont always match. But if help is needed:  · Choose clothing that is easy to put on and take off. Use Velcro shoes or slippers.  · Lay out a fresh outfit each day. Place clothes in the order they should be put on.  · If more help is needed, hand over clothing items one at a time. Explain how each item should be put on.  · Put dirty clothes away so theyre not worn again.  Bathing and grooming  Getting your loved one to bathe can be a real challenge. Try these tips:  · Treat bathing as a routine activity. But be flexible. A daily bath is probably unrealistic.  · Prepare bath items ahead of time, and be sure to test the water temperature.  · Avoid leaving your loved one alone in the bath or shower.             · Try visiting a barbershop or [employer] salon for help with hair washing, hair styling, and shaving.  Using the toilet  In later stages, dementia patients may develop incontinence (trouble controlling the bladder or bowel). To ease problems:  · Set a routine for using the toilet (for example, every 3 hours) and stick to it.  · Limit beverages before bedtime to prevent accidents. A bedside commode may also help.  · Be understanding if accidents happen. Your loved one may be as upset as you.  · At one point it may be necessary to have them wear an adult diaper.    · Talk to a healthcare provider if incontinence develops suddenly. It may signal other health issues that can be treated.  When to call the healthcare provider  Call the healthcare provider if you notice a sudden change in your loved ones behavior or emotions. These changes may be due to dementia. But they could also signal other  health problems that can be treated.   NOTE:This sheet is a summary. It may not cover all possible information. If you have questions about this medicine, talk to your doctor, pharmacist, or health care provider. Copyright© 2016 [employer]        Enfermedad renal: ¿cómo contar los líquidos?  Los riñones sanos equilibran la cantidad de líquido que entra y sale del cuerpo. Si pat riñones no pueden mantener scarlett equilibrio, zhao vez usted tenga que limitar la cantidad de líquidos que consume. Siempre consulte con vale proveedor de atención médica y pregúntele cuántas onzas de líquido puede beber cada 24 horas. La información de esta publicación puede resultarle útil.  ¿Qué se cuenta blair líquido?     Acostúmbrese a medir los líquidos o a comprobar el contenido de líquido antes de beber o comer algo.   Cualquier alimento que sea líquido a temperatura ambiente se cuenta para el cálculo del consumo de líquidos:  · Agua para beber y brittany medicamentos  · Hielo en cubitos o corrie  · El café y el té  · Las gaseosas  · La leche, la crema y los sustitutos de crema líquidos  · Los jugos de frutas y de verduras  · Las sopas  · Las paletas heladas  · El helado y los sorbetes  · La gelatina  Determinación de la cantidad de líquidos  Los líquidos pueden medirse de varias formas. La tabla siguiente puede ayudarle a convertir las unidades de medida. Para facilitar el uso, se trinidad redondeado algunos números.  1 litro 4.2 tazas 34 onzas 1,000 ml   1 cuarto de galón 4 tazas 32 onzas 1,000 ml   1 pinta 2 tazas 16 onzas 500 ml   1/2 pinta 1 taza 8 onzas 250 ml     1/2 taza 4 onzas 120 ml     1/3 taza 3 onzas 80 ml     1/4 taza 2 onzas 60 ml   2 cucharadas 1/8 taza 1 onza 30 ml      © 6756-8797 [employer]. [address] [place] [address], [place] [address]. Todos los derechos reservados. Esta información no pretende sustituir la atención médica profesional. Sólo vale médico puede diagnosticar y tratar un problema de agatha.

## 2017-01-12 ENCOUNTER — LAB VISIT (OUTPATIENT)
Dept: LAB | Facility: HOSPITAL | Age: 82
End: 2017-01-12
Attending: FAMILY MEDICINE
Payer: MEDICARE

## 2017-01-12 DIAGNOSIS — D72.829 LEUKOCYTOSIS, UNSPECIFIED TYPE: ICD-10-CM

## 2017-01-12 LAB
BASOPHILS # BLD AUTO: 0.03 K/UL
BASOPHILS NFR BLD: 0.2 %
DIFFERENTIAL METHOD: ABNORMAL
EOSINOPHIL # BLD AUTO: 0.2 K/UL
EOSINOPHIL NFR BLD: 1.2 %
ERYTHROCYTE [DISTWIDTH] IN BLOOD BY AUTOMATED COUNT: 14.2 %
HCT VFR BLD AUTO: 35.9 %
HGB BLD-MCNC: 11.9 G/DL
LYMPHOCYTES # BLD AUTO: 2.5 K/UL
LYMPHOCYTES NFR BLD: 15.4 %
MCH RBC QN AUTO: 29.6 PG
MCHC RBC AUTO-ENTMCNC: 33.1 %
MCV RBC AUTO: 89 FL
MONOCYTES # BLD AUTO: 1.1 K/UL
MONOCYTES NFR BLD: 6.6 %
NEUTROPHILS # BLD AUTO: 12.6 K/UL
NEUTROPHILS NFR BLD: 76.3 %
PLATELET # BLD AUTO: 292 K/UL
PMV BLD AUTO: 11.2 FL
RBC # BLD AUTO: 4.02 M/UL
WBC # BLD AUTO: 16.47 K/UL

## 2017-01-12 PROCEDURE — 36415 COLL VENOUS BLD VENIPUNCTURE: CPT | Mod: PO

## 2017-01-12 PROCEDURE — 85025 COMPLETE CBC W/AUTO DIFF WBC: CPT

## 2017-01-12 NOTE — TELEPHONE ENCOUNTER
Patient is coming tomorrow to repeat CBC.  Patient with markedly elevated white blood cell count.  Chest x-ray within acceptable parameters.  Patient daughter was notified.

## 2017-01-13 ENCOUNTER — TELEPHONE (OUTPATIENT)
Dept: FAMILY MEDICINE | Facility: CLINIC | Age: 82
End: 2017-01-13

## 2017-01-13 ENCOUNTER — HOSPITAL ENCOUNTER (EMERGENCY)
Facility: HOSPITAL | Age: 82
Discharge: HOME | End: 2017-01-14
Attending: EMERGENCY MEDICINE
Payer: MEDICARE

## 2017-01-13 VITALS
WEIGHT: 120 LBS | OXYGEN SATURATION: 100 % | RESPIRATION RATE: 20 BRPM | BODY MASS INDEX: 26.99 KG/M2 | DIASTOLIC BLOOD PRESSURE: 85 MMHG | HEART RATE: 82 BPM | TEMPERATURE: 98 F | HEIGHT: 56 IN | SYSTOLIC BLOOD PRESSURE: 157 MMHG

## 2017-01-13 DIAGNOSIS — N39.0 URINARY TRACT INFECTION WITHOUT HEMATURIA, SITE UNSPECIFIED: Primary | ICD-10-CM

## 2017-01-13 LAB
ALBUMIN SERPL BCP-MCNC: 3.1 G/DL
ALP SERPL-CCNC: 137 U/L
ALT SERPL W/O P-5'-P-CCNC: 10 U/L
ANION GAP SERPL CALC-SCNC: 12 MMOL/L
AST SERPL-CCNC: 20 U/L
BACTERIA #/AREA URNS HPF: ABNORMAL /HPF
BASOPHILS # BLD AUTO: 0.05 K/UL
BASOPHILS NFR BLD: 0.6 %
BILIRUB SERPL-MCNC: 0.9 MG/DL
BILIRUB UR QL STRIP: NEGATIVE
BUN SERPL-MCNC: 36 MG/DL
CALCIUM SERPL-MCNC: 9.5 MG/DL
CHLORIDE SERPL-SCNC: 106 MMOL/L
CLARITY UR: CLEAR
CO2 SERPL-SCNC: 23 MMOL/L
COLOR UR: YELLOW
CREAT SERPL-MCNC: 1.1 MG/DL
DIFFERENTIAL METHOD: ABNORMAL
EOSINOPHIL # BLD AUTO: 0.1 K/UL
EOSINOPHIL NFR BLD: 1.2 %
ERYTHROCYTE [DISTWIDTH] IN BLOOD BY AUTOMATED COUNT: 13.6 %
EST. GFR  (AFRICAN AMERICAN): 51 ML/MIN/1.73 M^2
EST. GFR  (NON AFRICAN AMERICAN): 45 ML/MIN/1.73 M^2
FLUAV AG SPEC QL IA: NEGATIVE
FLUBV AG SPEC QL IA: NEGATIVE
GLUCOSE SERPL-MCNC: 124 MG/DL
GLUCOSE UR QL STRIP: NEGATIVE
HCT VFR BLD AUTO: 35.7 %
HGB BLD-MCNC: 12 G/DL
HGB UR QL STRIP: ABNORMAL
HYALINE CASTS #/AREA URNS LPF: 0 /LPF
KETONES UR QL STRIP: NEGATIVE
LACTATE SERPL-SCNC: 1.4 MMOL/L
LEUKOCYTE ESTERASE UR QL STRIP: ABNORMAL
LYMPHOCYTES # BLD AUTO: 2.9 K/UL
LYMPHOCYTES NFR BLD: 33.1 %
MCH RBC QN AUTO: 30.2 PG
MCHC RBC AUTO-ENTMCNC: 33.6 %
MCV RBC AUTO: 90 FL
MICROSCOPIC COMMENT: ABNORMAL
MONOCYTES # BLD AUTO: 0.9 K/UL
MONOCYTES NFR BLD: 9.9 %
NEUTROPHILS # BLD AUTO: 4.7 K/UL
NEUTROPHILS NFR BLD: 54.5 %
NITRITE UR QL STRIP: POSITIVE
PH UR STRIP: 6 [PH] (ref 5–8)
PLATELET # BLD AUTO: 296 K/UL
PMV BLD AUTO: 10 FL
POTASSIUM SERPL-SCNC: 3.7 MMOL/L
PROT SERPL-MCNC: 7.5 G/DL
PROT UR QL STRIP: ABNORMAL
RBC # BLD AUTO: 3.98 M/UL
RBC #/AREA URNS HPF: 3 /HPF (ref 0–4)
SODIUM SERPL-SCNC: 141 MMOL/L
SP GR UR STRIP: 1.02 (ref 1–1.03)
SPECIMEN SOURCE: NORMAL
URN SPEC COLLECT METH UR: ABNORMAL
UROBILINOGEN UR STRIP-ACNC: 1 EU/DL
WBC # BLD AUTO: 8.62 K/UL
WBC #/AREA URNS HPF: 20 /HPF (ref 0–5)

## 2017-01-13 PROCEDURE — 87077 CULTURE AEROBIC IDENTIFY: CPT

## 2017-01-13 PROCEDURE — 80053 COMPREHEN METABOLIC PANEL: CPT

## 2017-01-13 PROCEDURE — 93005 ELECTROCARDIOGRAM TRACING: CPT

## 2017-01-13 PROCEDURE — 81000 URINALYSIS NONAUTO W/SCOPE: CPT

## 2017-01-13 PROCEDURE — 87088 URINE BACTERIA CULTURE: CPT

## 2017-01-13 PROCEDURE — 83605 ASSAY OF LACTIC ACID: CPT

## 2017-01-13 PROCEDURE — 63600175 PHARM REV CODE 636 W HCPCS: Performed by: EMERGENCY MEDICINE

## 2017-01-13 PROCEDURE — 99284 EMERGENCY DEPT VISIT MOD MDM: CPT | Mod: 25

## 2017-01-13 PROCEDURE — 25000003 PHARM REV CODE 250: Performed by: EMERGENCY MEDICINE

## 2017-01-13 PROCEDURE — 87086 URINE CULTURE/COLONY COUNT: CPT

## 2017-01-13 PROCEDURE — 87040 BLOOD CULTURE FOR BACTERIA: CPT

## 2017-01-13 PROCEDURE — 87186 SC STD MICRODIL/AGAR DIL: CPT

## 2017-01-13 PROCEDURE — 96365 THER/PROPH/DIAG IV INF INIT: CPT

## 2017-01-13 PROCEDURE — 85025 COMPLETE CBC W/AUTO DIFF WBC: CPT

## 2017-01-13 PROCEDURE — 87400 INFLUENZA A/B EACH AG IA: CPT | Mod: 59

## 2017-01-13 PROCEDURE — 96361 HYDRATE IV INFUSION ADD-ON: CPT

## 2017-01-13 RX ORDER — CIPROFLOXACIN 500 MG/1
500 TABLET ORAL 2 TIMES DAILY
Qty: 20 TABLET | Refills: 0 | Status: SHIPPED | OUTPATIENT
Start: 2017-01-13 | End: 2017-01-23

## 2017-01-13 RX ORDER — CIPROFLOXACIN 2 MG/ML
400 INJECTION, SOLUTION INTRAVENOUS
Status: COMPLETED | OUTPATIENT
Start: 2017-01-13 | End: 2017-01-14

## 2017-01-13 RX ADMIN — SODIUM CHLORIDE 500 ML: 0.9 INJECTION, SOLUTION INTRAVENOUS at 10:01

## 2017-01-13 RX ADMIN — CIPROFLOXACIN 400 MG: 2 INJECTION, SOLUTION INTRAVENOUS at 11:01

## 2017-01-13 NOTE — TELEPHONE ENCOUNTER
White blood cell count is still elevated.  I recommend probably evaluation at the emergency room for possible infection workup.

## 2017-01-13 NOTE — ED AVS SNAPSHOT
[facility]  [address]  [place] [address]               Moira Salter   2017  8:13 PM   ED    Description:  Female : 1927   Department:  [facility]           Your Care was Coordinated By:     Provider Role From To    Ty Thomas MD Attending Provider 17 --    Ty Thomas MD Physician 17    Ty Thomas MD Physician 17      Reason for Visit     Abnormal Lab           Diagnoses this Visit        Comments    Urinary tract infection without hematuria, site unspecified    -  Primary       ED Disposition     None           To Do List           Follow-up Information     Follow up with Forest Angeles MD In 2 days.    Specialty:  Family Medicine    Contact information:     [facility]  [place] [address]  [phone]         These Medications        Disp Refills Start End    ciprofloxacin HCl (CIPRO) 500 MG tablet 20 tablet 0 2017    Take 1 tablet (500 mg total) by mouth 2 (two) times daily. - Oral    Pharmacy: [id number]  [place], [place] - [address] Ph #: [phone]         [facility]     [facility] -  Assistance  Registered nurses in the [facility] provide clinical advisement, health education, appointment booking, and other advisory services.  Call for this free service at [phone].             Medications           Message regarding Medications     Verify the changes and/or additions to your medication regime listed below are the same as discussed with your clinician today.  If any of these changes or additions are incorrect, please notify your healthcare provider.        START taking these NEW medications        Refills    ciprofloxacin HCl (CIPRO) 500 MG tablet 0    Sig: Take 1 tablet (500 mg total) by mouth 2 (two) times daily.    Class: Print    Route: Oral      These  "medications were administered today        Dose Freq    sodium chloride 0.9% bolus 500 mL 500 mL Every 30 min    Sig: Inject 500 mLs into the vein every 30 (thirty) minutes.    Class: Normal    Route: Intravenous    ciprofloxacin (CIPRO)400mg/200ml D5W IVPB 400 mg 400 mg ED 1 Time    Sig: Inject 200 mLs (400 mg total) into the vein ED 1 Time.    Class: Normal    Route: Intravenous           Verify that the below list of medications is an accurate representation of the medications you are currently taking.  If none reported, the list may be blank. If incorrect, please contact your healthcare provider. Carry this list with you in case of emergency.           Current Medications     azithromycin (Z-COLETTE) 250 MG tablet Take 2 tablets by mouth on day 1; Take 1 tablet by mouth on days 2-5    ciprofloxacin (CIPRO)400mg/200ml D5W IVPB 400 mg Inject 200 mLs (400 mg total) into the vein ED 1 Time.    ciprofloxacin HCl (CIPRO) 500 MG tablet Take 1 tablet (500 mg total) by mouth 2 (two) times daily.    ibuprofen (ADVIL,MOTRIN) 200 MG tablet Take 200 mg by mouth every 8 (eight) hours as needed for Pain.    lactulose (CHRONULAC) 10 gram/15 mL solution Take by mouth 3 (three) times daily.    loratadine (CLARITIN) 10 mg tablet Take 10 mg by mouth once daily.    omeprazole (PRILOSEC) 40 MG capsule Take 1 capsule (40 mg total) by mouth before breakfast. 1 Capsule, Delayed Release(E.C.) Oral Every day.  30 minutes before breakfast    sodium chloride 0.9% bolus 500 mL Inject 500 mLs into the vein every 30 (thirty) minutes.           Clinical Reference Information           Your Vitals Were     BP Pulse Temp Resp Height Weight    153/102 74 97.8 °F (36.6 °C) (Axillary) 20 4' 8" (1.422 m) 54.4 kg (120 lb)    SpO2 BMI             97% 26.9 kg/m2         Allergies as of 1/13/2017        Reactions    Penicillins     Other reaction(s): Rash      Immunizations Administered on Date of Encounter - 1/13/2017     None      ED Micro, Lab, POCT     " "Start Ordered       Status Ordering Provider    01/13/17 2035 01/13/17 2035  Lactic acid, plasma  STAT      Final result     01/13/17 2035 01/13/17 2035  Urinalysis - Cath  STAT      Final result     01/13/17 2035 01/13/17 2035  Urine culture - Cath  STAT      In process     01/13/17 2035 01/13/17 2035  Urinalysis Microscopic  Once      Final result     01/13/17 2034 01/13/17 2035  Blood culture x two cultures. Draw prior to antibiotics.  Every 15 min     Comments:  Aerobic and anaerobic   Start Status   01/13/17 2034 In process   01/13/17 2049 In process       Acknowledged     01/13/17 2034 01/13/17 2035  CBC auto differential  STAT      Final result     01/13/17 2034 01/13/17 2035  Comprehensive metabolic panel  STAT      Final result     01/13/17 2034 01/13/17 2035  Influenza antigen Nasopharyngeal Swab  STAT      Final result       ED Imaging Orders     Start Ordered       Status Ordering Provider    01/13/17 2036 01/13/17 2035  X-Ray Chest PA And Lateral  1 time imaging      Final result         Discharge Instructions         Bladder Infection, Female (Adult)    Urine is normally free from bacteria. But bacteria can get into the urinary tract from the skin around the rectum, or it can travel in the blood from elsewhere in the body. Once it is in your urinary tract, it can cause infection in the urethra (urethritis), the bladder (cystitis), or the kidneys (pyelonephritis).  The most common place for an infection is in the bladder. This is called a bladder infection. This is one of the most common infections in women. Most bladder infections are easily treated. They are not serious unless the infection spreads up to the kidney.  The phrases "bladder infection", "UTI," and "cystitis," are often used to describe the same thing, but they are not always the same. Cystitis is an inflammation of the bladder. The most common cause of cystitis is an infection.  Symptoms  The infection causes inflammation in the urethra " and bladder, which causes many of the symptoms. The most common symptoms of a bladder infection are:  · Pain or burning when urinating  · Having to urinate more often than usual  · Urgent need to urinate  · Only a small amount of urine comes out  · Blood in urine  · Abdominal discomfort, usually in the lower abdomen, above the pubic bone  · Cloudy, strong, or bad smelling urine  · Urinary retention, being unable to urinate  · Unable to hold urine in (urinary incontinence)  · Fever  · Loss of appetite  · Confusion (in older adults)  Causes  Bladder infections are not contagious. You can't get one from someone else, from a toilet seat, or from sharing a bath.  The most common cause of bladder infections is bacteria from the bowels. The bacteria get onto the skin around the opening of the urethra. From there, it can get into the urine and travel up to the bladder, causing inflammation and infection. This usually happens because of:  · Wiping improperly after urinating--always wipe from front to back.  · Bowel incontinence  · Pregnancy  · Procedures such as having a catheter inserted  · Older age  · Not emptying your bladder (stagnated urine gives bacteria a chance to grow)  · Dehydration  · Constipation  · Sex  · Use of a diaphragm for birth control   Treatment  Bladder infections are diagnosed by a urine test. They are treated with antibiotics and usually clear up quickly without complications. Treatment helps prevent a more serious kidney infection.  Medicines  Medicines can help in the treatment of a bladder infection:  · Take antibiotics until they are used up, even if you feel better. It is important to finish them to make sure the infection has cleared.  · You can use acetaminophen or ibuprofen for pain, fever, or discomfort, unless another medicine was prescribed. You can also alternate them, or use both together. They work differently and are a different class of medicines, so taking them together is not an  overdose. If you have chronic liver or kidney disease, talk with your healthcare provider before using these medicines. Also talk with your provider if you've ever had a stomach ulcer or gastrointestinal bleeding, or are taking blood-thinner medicines.  · If you are given phenazopydridine to reduce burning with urination, it will cause your urine to become a bright orange color. This can stain clothing.  Care and prevention  These self-care steps can help prevent future infections:  · Drink plenty of fluids to prevent dehydration and flush out of the bladder. Do this unless you must restrict fluids for other health reasons, or your doctor told you not to.  · Proper cleaning after going to the bathroom is important. Wipe from front to back after using the toilet to prevent the spread of bacteria.  · Urinate more often. Don't try to hold urine in for a long time.  · Wear loose-fitting clothes and cotton underwear. Avoid tight-fitting pans.  · Improve your diet and prevent constipation. Eat more fresh fruit and vegetables, and fiber, and less junk and fatty foods.  · Avoid sex until your symptoms are gone.  · Avoid caffeine, alcohol, and spicy foods. These can irritate the bladder.  · Urinate right after intercourse to flush out the bladder.  · If you use birth control pills and have frequent bladder infections, discuss it with your doctor.  Follow-up care  Call your healthcare provider if all symptoms are not gone after 3 days of treatment. This is especially important if you have repeat infections.  If a culture was done, you will be told if your treatment needs to be changed. If directed, you can call to find out the results.  If X-rays were done, you will be told if the results will affect your treatment.  Call 911  Call emergency services if any of the following occur:  · Trouble breathing  · Difficulty arousing or confusion  · Fainting or loss of consciousness  · Rapid heart rate  When to seek medical  advice  Call your healthcare provider right away if any of these occur:  · Fever of 100.4ºF (38.0ºC) or higher, or as directed  · Symptoms are not better by the third day of treatment  · Back or belly (abdominal) pain that gets worse  · Repeated vomiting, or unable to keep medicine down  · Weakness or dizziness  · Vaginal discharge  · Pain, redness, or swelling in the outer vaginal area (labia)  © 8253-6966 [employer]. [address] [place] [address], [place] [address]. All rights reserved. This information is not intended as a substitute for professional medical care. Always follow your healthcare professional's instructions.          Your Scheduled Appointments     May 25, 2017  9:00 AM CDT   Established Patient Visit with Forest Angeles MD   [facility]    [address]  [place] [address]   [phone]               [facility] complies with applicable Federal civil rights laws and does not discriminate on the basis of race, color, national origin, age, disability, or sex.        Language Assistance Services     ATTENTION: Language assistance services are available, free of charge. Please call [phone].      ATENCIÓN: Si habla español, tiene a vale disposición servicios gratuitos de asistencia lingüística. Llame al [phone].     CHÚ Ý: N?u b?n nói Ti?ng Vi?t, có các d?ch v? h? tr? ngôn ng? mi?n phí dành cho b?n. G?i s? [phone].

## 2017-01-14 NOTE — ED NOTES
Patient identifiers for Moira Salter checked and correct.  LOC: The patient is awake, alert and aware of environment.  APPEARANCE: Hollering.  Uncooperative. Patient resting comfortably and in no acute distress.  SKIN: The skin is warm and dry, patient has normal skin turgor and moist mucus membranes.  MUSKULOSKELETAL: Patient moving all extremities well, no obvious swelling or deformities noted.  RESPIRATORY: Airway is open and patent, respirations are spontaneous, patient has a normal effort and rate.

## 2017-01-14 NOTE — DISCHARGE INSTRUCTIONS
"  Bladder Infection, Female (Adult)    Urine is normally free from bacteria. But bacteria can get into the urinary tract from the skin around the rectum, or it can travel in the blood from elsewhere in the body. Once it is in your urinary tract, it can cause infection in the urethra (urethritis), the bladder (cystitis), or the kidneys (pyelonephritis).  The most common place for an infection is in the bladder. This is called a bladder infection. This is one of the most common infections in women. Most bladder infections are easily treated. They are not serious unless the infection spreads up to the kidney.  The phrases "bladder infection", "UTI," and "cystitis," are often used to describe the same thing, but they are not always the same. Cystitis is an inflammation of the bladder. The most common cause of cystitis is an infection.  Symptoms  The infection causes inflammation in the urethra and bladder, which causes many of the symptoms. The most common symptoms of a bladder infection are:  · Pain or burning when urinating  · Having to urinate more often than usual  · Urgent need to urinate  · Only a small amount of urine comes out  · Blood in urine  · Abdominal discomfort, usually in the lower abdomen, above the pubic bone  · Cloudy, strong, or bad smelling urine  · Urinary retention, being unable to urinate  · Unable to hold urine in (urinary incontinence)  · Fever  · Loss of appetite  · Confusion (in older adults)  Causes  Bladder infections are not contagious. You can't get one from someone else, from a toilet seat, or from sharing a bath.  The most common cause of bladder infections is bacteria from the bowels. The bacteria get onto the skin around the opening of the urethra. From there, it can get into the urine and travel up to the bladder, causing inflammation and infection. This usually happens because of:  · Wiping improperly after urinating--always wipe from front to back.  · Bowel " incontinence  · Pregnancy  · Procedures such as having a catheter inserted  · Older age  · Not emptying your bladder (stagnated urine gives bacteria a chance to grow)  · Dehydration  · Constipation  · Sex  · Use of a diaphragm for birth control   Treatment  Bladder infections are diagnosed by a urine test. They are treated with antibiotics and usually clear up quickly without complications. Treatment helps prevent a more serious kidney infection.  Medicines  Medicines can help in the treatment of a bladder infection:  · Take antibiotics until they are used up, even if you feel better. It is important to finish them to make sure the infection has cleared.  · You can use acetaminophen or ibuprofen for pain, fever, or discomfort, unless another medicine was prescribed. You can also alternate them, or use both together. They work differently and are a different class of medicines, so taking them together is not an overdose. If you have chronic liver or kidney disease, talk with your healthcare provider before using these medicines. Also talk with your provider if you've ever had a stomach ulcer or gastrointestinal bleeding, or are taking blood-thinner medicines.  · If you are given phenazopydridine to reduce burning with urination, it will cause your urine to become a bright orange color. This can stain clothing.  Care and prevention  These self-care steps can help prevent future infections:  · Drink plenty of fluids to prevent dehydration and flush out of the bladder. Do this unless you must restrict fluids for other health reasons, or your doctor told you not to.  · Proper cleaning after going to the bathroom is important. Wipe from front to back after using the toilet to prevent the spread of bacteria.  · Urinate more often. Don't try to hold urine in for a long time.  · Wear loose-fitting clothes and cotton underwear. Avoid tight-fitting pans.  · Improve your diet and prevent constipation. Eat more fresh fruit and  vegetables, and fiber, and less junk and fatty foods.  · Avoid sex until your symptoms are gone.  · Avoid caffeine, alcohol, and spicy foods. These can irritate the bladder.  · Urinate right after intercourse to flush out the bladder.  · If you use birth control pills and have frequent bladder infections, discuss it with your doctor.  Follow-up care  Call your healthcare provider if all symptoms are not gone after 3 days of treatment. This is especially important if you have repeat infections.  If a culture was done, you will be told if your treatment needs to be changed. If directed, you can call to find out the results.  If X-rays were done, you will be told if the results will affect your treatment.  Call 911  Call emergency services if any of the following occur:  · Trouble breathing  · Difficulty arousing or confusion  · Fainting or loss of consciousness  · Rapid heart rate  When to seek medical advice  Call your healthcare provider right away if any of these occur:  · Fever of 100.4ºF (38.0ºC) or higher, or as directed  · Symptoms are not better by the third day of treatment  · Back or belly (abdominal) pain that gets worse  · Repeated vomiting, or unable to keep medicine down  · Weakness or dizziness  · Vaginal discharge  · Pain, redness, or swelling in the outer vaginal area (labia)  © 2890-9761 [employer]. [address] [place] [address], [place] [address]. All rights reserved. This information is not intended as a substitute for professional medical care. Always follow your healthcare professional's instructions.

## 2017-01-14 NOTE — ED PROVIDER NOTES
Encounter Date: 1/13/2017       History     Chief Complaint   Patient presents with    Abnormal Lab     saw Dr Bales yesterday and was told today WBC high on labs from that appointment     Review of patient's allergies indicates:   Allergen Reactions    Penicillins      Other reaction(s): Rash     HPI Comments:  89 years old female with a history of dementia. She was seen at her PCPs office a few days ago with URI symptoms and started on azithromycin. Her family were told to bring her to the ED tonight for evaluation of an elevated WBC from blood drawn at her PCP's office. Pt unable to give history due to severe dementia.       The history is provided by a relative and medical records.     Past Medical History   Diagnosis Date    DEMENTIA     Hypertension     Osteoporosis      No past medical history pertinent negatives.  History reviewed. No pertinent past surgical history.  Family History   Problem Relation Age of Onset    Melanoma Neg Hx      Social History   Substance Use Topics    Smoking status: Never Smoker    Smokeless tobacco: Never Used    Alcohol use No     Review of Systems   All other systems reviewed and are negative.      Physical Exam   Initial Vitals   BP Pulse Resp Temp SpO2   01/13/17 1726 01/13/17 1726 01/13/17 1726 01/13/17 1726 01/13/17 1726   171/67 102 20 99.6 °F (37.6 °C) 96 %     Physical Exam    Nursing note and vitals reviewed.  Constitutional: Vital signs are normal. She appears well-developed and well-nourished. She is not diaphoretic. No distress.   HENT:   Head: Normocephalic and atraumatic.   Mouth/Throat: No oropharyngeal exudate.   Mucus membranes dry   Eyes: Conjunctivae and EOM are normal. Pupils are equal, round, and reactive to light.   Neck: Normal range of motion. Neck supple.   Cardiovascular: Normal rate, regular rhythm and normal heart sounds.   Pulmonary/Chest: Breath sounds normal. No respiratory distress. She has no wheezes. She has no rhonchi. She has no  rales.   Abdominal: Soft. She exhibits no distension. There is no tenderness. There is no rebound and no guarding.   Musculoskeletal: Normal range of motion. She exhibits no edema or tenderness.   Neurological: She is alert and oriented to person, place, and time.   Skin: Skin is warm and dry.   Psychiatric: She has a normal mood and affect.         ED Course   Procedures  Labs Reviewed   CBC W/ AUTO DIFFERENTIAL - Abnormal; Notable for the following:        Result Value    RBC 3.98 (*)     Hematocrit 35.7 (*)     All other components within normal limits   COMPREHENSIVE METABOLIC PANEL - Abnormal; Notable for the following:     Glucose 124 (*)     BUN, Bld 36 (*)     Albumin 3.1 (*)     Alkaline Phosphatase 137 (*)     eGFR if  51 (*)     eGFR if non  45 (*)     All other components within normal limits   URINALYSIS - Abnormal; Notable for the following:     Protein, UA 1+ (*)     Occult Blood UA Trace (*)     Nitrite, UA Positive (*)     Leukocytes, UA 2+ (*)     All other components within normal limits   URINALYSIS MICROSCOPIC - Abnormal; Notable for the following:     WBC, UA 20 (*)     Bacteria, UA Moderate (*)     All other components within normal limits   CULTURE, BLOOD    Narrative:     Aerobic and anaerobic   CULTURE, BLOOD    Narrative:     Aerobic and anaerobic   CULTURE, URINE   INFLUENZA A AND B ANTIGEN   LACTIC ACID, PLASMA                          Attending Attestation:             Attending ED Notes:   Patient hemodynamically stable with the urinary tract infection. Family wishes outpt treatment due to severe dementia and will return for any concerns.           ED Course     Clinical Impression:   The encounter diagnosis was Urinary tract infection without hematuria, site unspecified.    Disposition:   Disposition: Discharged  Condition: Stable       Ty Thomas MD  01/13/17 1253

## 2017-01-16 LAB — BACTERIA UR CULT: NORMAL

## 2017-01-19 LAB
BACTERIA BLD CULT: NORMAL
BACTERIA BLD CULT: NORMAL

## 2017-07-13 ENCOUNTER — LAB VISIT (OUTPATIENT)
Dept: LAB | Facility: HOSPITAL | Age: 82
End: 2017-07-13
Attending: FAMILY MEDICINE
Payer: MEDICARE

## 2017-07-13 ENCOUNTER — OFFICE VISIT (OUTPATIENT)
Dept: FAMILY MEDICINE | Facility: CLINIC | Age: 82
End: 2017-07-13
Payer: MEDICARE

## 2017-07-13 VITALS
HEIGHT: 56 IN | BODY MASS INDEX: 22.82 KG/M2 | HEART RATE: 75 BPM | SYSTOLIC BLOOD PRESSURE: 110 MMHG | WEIGHT: 101.44 LBS | DIASTOLIC BLOOD PRESSURE: 64 MMHG

## 2017-07-13 DIAGNOSIS — N18.30 CKD (CHRONIC KIDNEY DISEASE) STAGE 3, GFR 30-59 ML/MIN: ICD-10-CM

## 2017-07-13 DIAGNOSIS — F03.91 DEMENTIA WITH BEHAVIORAL DISTURBANCE, UNSPECIFIED DEMENTIA TYPE: Primary | ICD-10-CM

## 2017-07-13 DIAGNOSIS — I10 ESSENTIAL HYPERTENSION: ICD-10-CM

## 2017-07-13 DIAGNOSIS — F33.1 MODERATE EPISODE OF RECURRENT MAJOR DEPRESSIVE DISORDER: ICD-10-CM

## 2017-07-13 DIAGNOSIS — F03.91 DEMENTIA WITH BEHAVIORAL DISTURBANCE, UNSPECIFIED DEMENTIA TYPE: ICD-10-CM

## 2017-07-13 LAB
ALBUMIN SERPL BCP-MCNC: 3.4 G/DL
ALP SERPL-CCNC: 91 U/L
ALT SERPL W/O P-5'-P-CCNC: 9 U/L
ANION GAP SERPL CALC-SCNC: 8 MMOL/L
AST SERPL-CCNC: 20 U/L
BASOPHILS # BLD AUTO: 0.03 K/UL
BASOPHILS NFR BLD: 0.5 %
BILIRUB SERPL-MCNC: 1 MG/DL
BUN SERPL-MCNC: 15 MG/DL
CALCIUM SERPL-MCNC: 9.5 MG/DL
CHLORIDE SERPL-SCNC: 107 MMOL/L
CHOLEST/HDLC SERPL: 4.4 {RATIO}
CO2 SERPL-SCNC: 25 MMOL/L
CREAT SERPL-MCNC: 1 MG/DL
DIFFERENTIAL METHOD: ABNORMAL
EOSINOPHIL # BLD AUTO: 0.2 K/UL
EOSINOPHIL NFR BLD: 2.8 %
ERYTHROCYTE [DISTWIDTH] IN BLOOD BY AUTOMATED COUNT: 14.9 %
EST. GFR  (AFRICAN AMERICAN): 57.3 ML/MIN/1.73 M^2
EST. GFR  (NON AFRICAN AMERICAN): 49.7 ML/MIN/1.73 M^2
GLUCOSE SERPL-MCNC: 76 MG/DL
HCT VFR BLD AUTO: 36.1 %
HDL/CHOLESTEROL RATIO: 22.6 %
HDLC SERPL-MCNC: 190 MG/DL
HDLC SERPL-MCNC: 43 MG/DL
HGB BLD-MCNC: 12 G/DL
LDLC SERPL CALC-MCNC: 125.2 MG/DL
LYMPHOCYTES # BLD AUTO: 3.1 K/UL
LYMPHOCYTES NFR BLD: 46.6 %
MCH RBC QN AUTO: 30.6 PG
MCHC RBC AUTO-ENTMCNC: 33.2 %
MCV RBC AUTO: 92 FL
MONOCYTES # BLD AUTO: 0.8 K/UL
MONOCYTES NFR BLD: 11.8 %
NEUTROPHILS # BLD AUTO: 2.5 K/UL
NEUTROPHILS NFR BLD: 38.1 %
NONHDLC SERPL-MCNC: 147 MG/DL
PLATELET # BLD AUTO: 206 K/UL
PMV BLD AUTO: 11.4 FL
POTASSIUM SERPL-SCNC: 4.2 MMOL/L
PROT SERPL-MCNC: 7.4 G/DL
RBC # BLD AUTO: 3.92 M/UL
SODIUM SERPL-SCNC: 140 MMOL/L
TRIGL SERPL-MCNC: 109 MG/DL
TSH SERPL DL<=0.005 MIU/L-ACNC: 2.23 UIU/ML
WBC # BLD AUTO: 6.54 K/UL

## 2017-07-13 PROCEDURE — 80061 LIPID PANEL: CPT

## 2017-07-13 PROCEDURE — 36415 COLL VENOUS BLD VENIPUNCTURE: CPT | Mod: PO

## 2017-07-13 PROCEDURE — 99999 PR PBB SHADOW E&M-EST. PATIENT-LVL II: CPT | Mod: PBBFAC,,, | Performed by: FAMILY MEDICINE

## 2017-07-13 PROCEDURE — 85025 COMPLETE CBC W/AUTO DIFF WBC: CPT

## 2017-07-13 PROCEDURE — 1126F AMNT PAIN NOTED NONE PRSNT: CPT | Mod: S$GLB,,, | Performed by: FAMILY MEDICINE

## 2017-07-13 PROCEDURE — 80053 COMPREHEN METABOLIC PANEL: CPT

## 2017-07-13 PROCEDURE — 84443 ASSAY THYROID STIM HORMONE: CPT

## 2017-07-13 PROCEDURE — 1159F MED LIST DOCD IN RCRD: CPT | Mod: S$GLB,,, | Performed by: FAMILY MEDICINE

## 2017-07-13 PROCEDURE — 99214 OFFICE O/P EST MOD 30 MIN: CPT | Mod: S$GLB,,, | Performed by: FAMILY MEDICINE

## 2017-07-13 NOTE — PROGRESS NOTES
Subjective:       Patient ID: Moira Salter is a 89 y.o. female.    Chief Complaint: Follow-up    89 years old female who came to the clinic for dementia follow-up.  Patient currently with 24-hour supervision for all her activities of daily living.  Family is able to feed the patient with processed meals.  Patient with decreased kidney function but stable in comparison with previous reports.  Family stated that she is only taking the ibuprofen as needed no on daily basis.  Family was oriented about possible side effects of ibuprofen with worsening of the kidney function.  No recent flareups of depression.  Sometimes with behavioral symptoms.  Blood pressure today stable.  No chest pain palpitations orthopnea or PND.      Review of Systems   Constitutional: Negative.    HENT: Negative.    Eyes: Negative.    Respiratory: Negative.    Cardiovascular: Negative.  Negative for chest pain, palpitations and leg swelling.   Gastrointestinal: Negative.    Genitourinary: Negative.    Musculoskeletal: Negative.    Skin: Negative.    Neurological: Negative.    Psychiatric/Behavioral: Negative.        Objective:      Physical Exam   Constitutional: She is oriented to person, place, and time. She appears well-developed and well-nourished. No distress.   HENT:   Head: Normocephalic and atraumatic.   Right Ear: External ear normal.   Left Ear: External ear normal.   Nose: Nose normal.   Mouth/Throat: Oropharynx is clear and moist. No oropharyngeal exudate.   Eyes: Conjunctivae and EOM are normal. Pupils are equal, round, and reactive to light. Right eye exhibits no discharge. Left eye exhibits no discharge. No scleral icterus.   Neck: Normal range of motion. Neck supple. No JVD present. No tracheal deviation present. No thyromegaly present.   Cardiovascular: Normal rate, regular rhythm, normal heart sounds and intact distal pulses.  Exam reveals no gallop and no friction rub.    No murmur heard.  Pulmonary/Chest: Effort normal and  breath sounds normal. No stridor. No respiratory distress. She has no wheezes. She has no rales. She exhibits no tenderness.   Abdominal: Soft. Bowel sounds are normal. She exhibits no distension and no mass. There is no tenderness. There is no rebound and no guarding.   Musculoskeletal: Normal range of motion. She exhibits no edema or tenderness.   Lymphadenopathy:     She has no cervical adenopathy.   Neurological: She is alert and oriented to person, place, and time. She has normal reflexes. No cranial nerve deficit. She exhibits normal muscle tone. Coordination normal.   Skin: Skin is warm and dry. No rash noted. She is not diaphoretic. No erythema. No pallor.   Psychiatric: She has a normal mood and affect. Her behavior is normal. Judgment and thought content normal.       Assessment:       1. Dementia with behavioral disturbance, unspecified dementia type    2. Essential hypertension    3. Moderate episode of recurrent major depressive disorder    4. CKD (chronic kidney disease) stage 3, GFR 30-59 ml/min        Plan:     Moira was seen today for follow-up.    Diagnoses and all orders for this visit:    Dementia with behavioral disturbance, unspecified dementia type  -     Comprehensive metabolic panel; Future  -     TSH; Future  -     CBC auto differential; Future    Essential hypertension  -     Comprehensive metabolic panel; Future  -     TSH; Future  -     CBC auto differential; Future  -     Lipid panel; Future    Moderate episode of recurrent major depressive disorder  -     TSH; Future    CKD (chronic kidney disease) stage 3, GFR 30-59 ml/min  -     Comprehensive metabolic panel; Future  -     CBC auto differential; Future

## 2018-05-31 ENCOUNTER — LAB VISIT (OUTPATIENT)
Dept: LAB | Facility: HOSPITAL | Age: 83
End: 2018-05-31
Attending: FAMILY MEDICINE
Payer: MEDICARE

## 2018-05-31 ENCOUNTER — OFFICE VISIT (OUTPATIENT)
Dept: FAMILY MEDICINE | Facility: CLINIC | Age: 83
End: 2018-05-31
Payer: MEDICARE

## 2018-05-31 ENCOUNTER — HOSPITAL ENCOUNTER (OUTPATIENT)
Dept: RADIOLOGY | Facility: HOSPITAL | Age: 83
Discharge: HOME | End: 2018-05-31
Attending: FAMILY MEDICINE
Payer: MEDICARE

## 2018-05-31 VITALS
OXYGEN SATURATION: 97 % | HEIGHT: 56 IN | HEART RATE: 84 BPM | DIASTOLIC BLOOD PRESSURE: 68 MMHG | WEIGHT: 83.75 LBS | SYSTOLIC BLOOD PRESSURE: 120 MMHG | BODY MASS INDEX: 18.84 KG/M2

## 2018-05-31 DIAGNOSIS — H10.021 OTHER MUCOPURULENT CONJUNCTIVITIS OF RIGHT EYE: ICD-10-CM

## 2018-05-31 DIAGNOSIS — I83.893 VARICOSE VEINS OF BILATERAL LOWER EXTREMITIES WITH OTHER COMPLICATIONS: ICD-10-CM

## 2018-05-31 DIAGNOSIS — R58 ECCHYMOSIS: ICD-10-CM

## 2018-05-31 DIAGNOSIS — N18.5 CKD (CHRONIC KIDNEY DISEASE) STAGE 5, GFR LESS THAN 15 ML/MIN: ICD-10-CM

## 2018-05-31 DIAGNOSIS — K59.00 CONSTIPATION, UNSPECIFIED CONSTIPATION TYPE: Primary | ICD-10-CM

## 2018-05-31 DIAGNOSIS — F03.91 DEMENTIA WITH BEHAVIORAL DISTURBANCE, UNSPECIFIED DEMENTIA TYPE: ICD-10-CM

## 2018-05-31 DIAGNOSIS — E44.0 MODERATE PROTEIN-CALORIE MALNUTRITION: ICD-10-CM

## 2018-05-31 LAB
ALBUMIN SERPL BCP-MCNC: 3.4 G/DL
ANION GAP SERPL CALC-SCNC: 8 MMOL/L
BASOPHILS # BLD AUTO: 0.04 K/UL
BASOPHILS NFR BLD: 0.6 %
BUN SERPL-MCNC: 24 MG/DL
CALCIUM SERPL-MCNC: 9.4 MG/DL
CHLORIDE SERPL-SCNC: 103 MMOL/L
CO2 SERPL-SCNC: 26 MMOL/L
CREAT SERPL-MCNC: 1 MG/DL
DIFFERENTIAL METHOD: ABNORMAL
EOSINOPHIL # BLD AUTO: 0.1 K/UL
EOSINOPHIL NFR BLD: 1.2 %
ERYTHROCYTE [DISTWIDTH] IN BLOOD BY AUTOMATED COUNT: 14.6 %
EST. GFR  (AFRICAN AMERICAN): 56.9 ML/MIN/1.73 M^2
EST. GFR  (NON AFRICAN AMERICAN): 49.4 ML/MIN/1.73 M^2
GLUCOSE SERPL-MCNC: 92 MG/DL
HCT VFR BLD AUTO: 37 %
HGB BLD-MCNC: 12.1 G/DL
IMM GRANULOCYTES # BLD AUTO: 0.02 K/UL
IMM GRANULOCYTES NFR BLD AUTO: 0.3 %
INR PPP: 1
LYMPHOCYTES # BLD AUTO: 3 K/UL
LYMPHOCYTES NFR BLD: 46.5 %
MCH RBC QN AUTO: 30.6 PG
MCHC RBC AUTO-ENTMCNC: 32.7 G/DL
MCV RBC AUTO: 94 FL
MONOCYTES # BLD AUTO: 0.6 K/UL
MONOCYTES NFR BLD: 9.7 %
NEUTROPHILS # BLD AUTO: 2.7 K/UL
NEUTROPHILS NFR BLD: 41.7 %
NRBC BLD-RTO: 0 /100 WBC
PHOSPHATE SERPL-MCNC: 3.6 MG/DL
PLATELET # BLD AUTO: 193 K/UL
PMV BLD AUTO: 11.3 FL
POTASSIUM SERPL-SCNC: 4 MMOL/L
PROTHROMBIN TIME: 10.6 SEC
RBC # BLD AUTO: 3.95 M/UL
SODIUM SERPL-SCNC: 137 MMOL/L
WBC # BLD AUTO: 6.48 K/UL

## 2018-05-31 PROCEDURE — 80069 RENAL FUNCTION PANEL: CPT

## 2018-05-31 PROCEDURE — 85025 COMPLETE CBC W/AUTO DIFF WBC: CPT

## 2018-05-31 PROCEDURE — 99214 OFFICE O/P EST MOD 30 MIN: CPT | Mod: S$GLB,,, | Performed by: FAMILY MEDICINE

## 2018-05-31 PROCEDURE — 36415 COLL VENOUS BLD VENIPUNCTURE: CPT | Mod: PO

## 2018-05-31 PROCEDURE — 99999 PR PBB SHADOW E&M-EST. PATIENT-LVL III: CPT | Mod: PBBFAC,,, | Performed by: FAMILY MEDICINE

## 2018-05-31 PROCEDURE — 93970 EXTREMITY STUDY: CPT | Mod: TC

## 2018-05-31 PROCEDURE — 85610 PROTHROMBIN TIME: CPT

## 2018-05-31 PROCEDURE — 99499 UNLISTED E&M SERVICE: CPT | Mod: S$GLB,,, | Performed by: FAMILY MEDICINE

## 2018-05-31 PROCEDURE — 93970 EXTREMITY STUDY: CPT | Mod: 26,,, | Performed by: RADIOLOGY

## 2018-05-31 RX ORDER — TOBRAMYCIN 3 MG/ML
1 SOLUTION/ DROPS OPHTHALMIC EVERY 4 HOURS
Qty: 5 ML | Refills: 0 | Status: ON HOLD | OUTPATIENT
Start: 2018-05-31 | End: 2019-11-05 | Stop reason: HOSPADM

## 2018-05-31 RX ORDER — POLYETHYLENE GLYCOL 3350 17 G/17G
17 POWDER, FOR SOLUTION ORAL DAILY
Qty: 1700 G | Refills: 1 | Status: SHIPPED | OUTPATIENT
Start: 2018-05-31

## 2018-05-31 NOTE — PROGRESS NOTES
Subjective:       Patient ID: Moira Salter is a 89 y.o. female.    Chief Complaint: Annual Exam; Bleeding/Bruising; and Conjunctivitis    89 years old female who came to the clinic with the family in the with bilateral lower extremities ecchymosis for the last month.  Family's concern of possible blood clot.  Patient was using Advil for pain.  Patient with bilateral varicose veins. Patient with significant weight loss with poor protein intake at home.  Family tried to blenderized meals for the patient.  Patient with right eye redness for the last 24 hr.  Patient with yellow discharge. Patient with episodic constipation with significant improvement using MiraLax.  Patient with severe dementia who needs assistance for her activities of daily living /24 hr supervision.  Patient with decreased kidney function no on dialysis.        Conjunctivitis   Associated symptoms include arthralgias. Pertinent negatives include no fever.     Review of Systems   Constitutional: Positive for unexpected weight change. Negative for fever.   HENT: Negative.    Eyes: Negative.    Respiratory: Negative.    Cardiovascular: Negative.    Gastrointestinal: Negative.    Genitourinary: Negative.    Musculoskeletal: Positive for arthralgias.   Skin: Negative.    Neurological: Negative.    Hematological: Negative for adenopathy. Bruises/bleeds easily.   Psychiatric/Behavioral: Positive for decreased concentration.       Objective:      Physical Exam   Constitutional: She is oriented to person, place, and time. She appears well-developed and well-nourished. She appears lethargic. She is uncooperative. No distress.   HENT:   Head: Normocephalic and atraumatic.   Right Ear: External ear normal.   Left Ear: External ear normal.   Nose: Nose normal.   Mouth/Throat: Oropharynx is clear and moist. No oropharyngeal exudate.   Eyes: EOM are normal. Pupils are equal, round, and reactive to light. Right eye exhibits discharge and exudate. Right eye  exhibits no hordeolum. No foreign body present in the right eye. Left eye exhibits no discharge. Right conjunctiva is injected. Right conjunctiva has no hemorrhage. No scleral icterus.   Neck: Normal range of motion. Neck supple. No JVD present. No tracheal deviation present. No thyromegaly present.   Cardiovascular: Normal rate, regular rhythm, normal heart sounds and intact distal pulses.  Exam reveals no gallop and no friction rub.    No murmur heard.  Pulmonary/Chest: Effort normal and breath sounds normal. No stridor. No respiratory distress. She has no wheezes. She has no rales. She exhibits no tenderness.   Abdominal: Soft. Bowel sounds are normal. She exhibits no distension and no mass. There is no tenderness. There is no rebound and no guarding.   Musculoskeletal: Normal range of motion. She exhibits no edema or tenderness.   Lymphadenopathy:     She has no cervical adenopathy.   Neurological: She is oriented to person, place, and time. She has normal reflexes. She appears lethargic. No cranial nerve deficit. She exhibits normal muscle tone. Coordination and gait abnormal.   Skin: Skin is warm and dry. Ecchymosis noted. No rash noted. She is not diaphoretic. No erythema. No pallor.        Psychiatric: Her behavior is normal. Judgment and thought content normal. Her mood appears anxious. Her affect is not angry, not blunt, not labile and not inappropriate. Cognition and memory are impaired. She exhibits a depressed mood. She exhibits abnormal recent memory. She exhibits normal remote memory.       Assessment:       1. Constipation, unspecified constipation type    2. Ecchymosis    3. Other mucopurulent conjunctivitis of right eye    4. Moderate protein-calorie malnutrition    5. Dementia with behavioral disturbance, unspecified dementia type    6. CKD (chronic kidney disease) stage 5, GFR less than 15 ml/min    7. Varicose veins of bilateral lower extremities with other complications         Plan:          Moiar was seen today for annual exam, bleeding/bruising and conjunctivitis.    Diagnoses and all orders for this visit:    Constipation, unspecified constipation type  -     polyethylene glycol (GLYCOLAX) 17 gram/dose powder; Take 17 g by mouth once daily.    Ecchymosis  -     CBC auto differential; Future  -     Protime-INR; Future  -     US Lower Extremity Veins Bilateral; Future    Other mucopurulent conjunctivitis of right eye  -     tobramycin sulfate 0.3% (TOBREX) 0.3 % ophthalmic solution; Place 1 drop into the right eye every 4 (four) hours.    Moderate protein-calorie malnutrition    Dementia with behavioral disturbance, unspecified dementia type    CKD (chronic kidney disease) stage 5, GFR less than 15 ml/min  -     Renal function panel; Future    Varicose veins of bilateral lower extremities with other complications   -     US Lower Extremity Veins Bilateral; Future

## 2018-05-31 NOTE — PATIENT INSTRUCTIONS
Diet for Chronic Kidney Disease  Following a special diet when you have kidney disease can help you stay as healthy as possible. Your healthcare provider or dietitian should make a special diet plan just for you.    Eating right  Here are some good eating rules to follow:  · Protein. Eating protein is important for your body. But too much protein can put a strain on your kidneys. Eating less protein may slow the progression of chronic kidney disease. Foods high in protein include meat, fish, eggs, cheese, and other dairy products. A registered dietitian can help you plan a diet that has the right amount of protein for you.  · Sodium. Having too much salt in your diet can make your body hold onto (retain) water. Ask your provider or dietitian how much sodium per day you are allowed. This will help you avoid fluid buildup in your body (fluid retention). It can also help control high blood pressure. Learn to read food labels to know how much sodium is in one serving. Foods high in salt include processed meats, canned and boxed foods, sauces, salted chips and snacks, pickled foods, frozen dinners, and restaurant and fast food.  · Fluids. If you have advanced kidney disease, you will need to limit the water and fluids you drink. If you dont, then too much water will build up in your body. The exact amount of fluid you can drink depends on how well your kidneys are working. Ask your provider how much water you can safely drink each day.  · Potassium. In advanced kidney disease, your potassium level can go dangerously high. This affects your heart. It can cause an irregular heartbeat (arrhythmia). Ask your provider or dietitian if you should limit potassium in your diet. Foods high in potassium include dairy products (milk, yogurt, cheese), dried beans, bananas, oranges, potatoes, tomatoes, spinach, cantaloupe, honeydew melon, dried fruits, and nuts.   · Calcium. Calcium is important to build strong bones. But foods  high in calcium are also high in phosphorus, which can take calcium from your bones. Limiting foods high in phosphorus will help keep calcium in your bones. Ask your provider how much calcium you should get each day.  · Phosphorus. In advanced kidney disease, your phosphorus level can go dangerously high. This affects many systems in the body and can damage your heart. Limit your intake of phosphorus-rich foods. These include dried beans and peas, nuts, peanut butter, cocoa, beer, cola drinks, and dairy products.  Date Last Reviewed: 8/1/2016  © 5357-5646 [employer]. [address] [place] [address], [place] [address]. All rights reserved. This information is not intended as a substitute for professional medical care. Always follow your healthcare professional's instructions.

## 2019-06-26 ENCOUNTER — TELEPHONE (OUTPATIENT)
Dept: FAMILY MEDICINE | Facility: CLINIC | Age: 84
End: 2019-06-26

## 2019-06-26 NOTE — TELEPHONE ENCOUNTER
----- Message from Diana Ernandez sent at 6/26/2019 10:31 AM CDT -----  Contact: Trice, daughter, [phone]  Called in returning your call. Please advise.

## 2019-07-16 ENCOUNTER — OFFICE VISIT (OUTPATIENT)
Dept: FAMILY MEDICINE | Facility: CLINIC | Age: 84
End: 2019-07-16
Payer: MEDICARE

## 2019-07-16 ENCOUNTER — LAB VISIT (OUTPATIENT)
Dept: LAB | Facility: HOSPITAL | Age: 84
End: 2019-07-16
Attending: FAMILY MEDICINE
Payer: MEDICARE

## 2019-07-16 VITALS
BODY MASS INDEX: 18.84 KG/M2 | OXYGEN SATURATION: 98 % | SYSTOLIC BLOOD PRESSURE: 138 MMHG | HEART RATE: 67 BPM | DIASTOLIC BLOOD PRESSURE: 90 MMHG | WEIGHT: 83.75 LBS | HEIGHT: 56 IN

## 2019-07-16 DIAGNOSIS — R54 AGE-RELATED PHYSICAL DEBILITY: ICD-10-CM

## 2019-07-16 DIAGNOSIS — I10 ESSENTIAL HYPERTENSION: ICD-10-CM

## 2019-07-16 DIAGNOSIS — F03.91 DEMENTIA WITH BEHAVIORAL DISTURBANCE, UNSPECIFIED DEMENTIA TYPE: Primary | ICD-10-CM

## 2019-07-16 DIAGNOSIS — R26.9 ABNORMALITY OF GAIT: ICD-10-CM

## 2019-07-16 DIAGNOSIS — N18.30 CKD (CHRONIC KIDNEY DISEASE) STAGE 3, GFR 30-59 ML/MIN: ICD-10-CM

## 2019-07-16 DIAGNOSIS — F03.91 DEMENTIA WITH BEHAVIORAL DISTURBANCE, UNSPECIFIED DEMENTIA TYPE: ICD-10-CM

## 2019-07-16 LAB
ALBUMIN SERPL BCP-MCNC: 3.4 G/DL (ref 3.5–5.2)
ALP SERPL-CCNC: 83 U/L (ref 55–135)
ALT SERPL W/O P-5'-P-CCNC: 12 U/L (ref 10–44)
ANION GAP SERPL CALC-SCNC: 10 MMOL/L (ref 8–16)
AST SERPL-CCNC: 23 U/L (ref 10–40)
BASOPHILS # BLD AUTO: 0.04 K/UL (ref 0–0.2)
BASOPHILS NFR BLD: 0.5 % (ref 0–1.9)
BILIRUB SERPL-MCNC: 0.5 MG/DL (ref 0.1–1)
BUN SERPL-MCNC: 21 MG/DL (ref 10–30)
CALCIUM SERPL-MCNC: 9.5 MG/DL (ref 8.7–10.5)
CHLORIDE SERPL-SCNC: 109 MMOL/L (ref 95–110)
CO2 SERPL-SCNC: 21 MMOL/L (ref 23–29)
CREAT SERPL-MCNC: 1 MG/DL (ref 0.5–1.4)
DIFFERENTIAL METHOD: ABNORMAL
EOSINOPHIL # BLD AUTO: 0.2 K/UL (ref 0–0.5)
EOSINOPHIL NFR BLD: 2.5 % (ref 0–8)
ERYTHROCYTE [DISTWIDTH] IN BLOOD BY AUTOMATED COUNT: 13.7 % (ref 11.5–14.5)
EST. GFR  (AFRICAN AMERICAN): 56.5 ML/MIN/1.73 M^2
EST. GFR  (NON AFRICAN AMERICAN): 49 ML/MIN/1.73 M^2
GLUCOSE SERPL-MCNC: 143 MG/DL (ref 70–110)
HCT VFR BLD AUTO: 35.3 % (ref 37–48.5)
HGB BLD-MCNC: 11.1 G/DL (ref 12–16)
IMM GRANULOCYTES # BLD AUTO: 0.05 K/UL (ref 0–0.04)
IMM GRANULOCYTES NFR BLD AUTO: 0.6 % (ref 0–0.5)
LYMPHOCYTES # BLD AUTO: 3.8 K/UL (ref 1–4.8)
LYMPHOCYTES NFR BLD: 47.3 % (ref 18–48)
MCH RBC QN AUTO: 31.5 PG (ref 27–31)
MCHC RBC AUTO-ENTMCNC: 31.4 G/DL (ref 32–36)
MCV RBC AUTO: 100 FL (ref 82–98)
MONOCYTES # BLD AUTO: 0.6 K/UL (ref 0.3–1)
MONOCYTES NFR BLD: 7 % (ref 4–15)
NEUTROPHILS # BLD AUTO: 3.4 K/UL (ref 1.8–7.7)
NEUTROPHILS NFR BLD: 42.1 % (ref 38–73)
NRBC BLD-RTO: 0 /100 WBC
PLATELET # BLD AUTO: 184 K/UL (ref 150–350)
PMV BLD AUTO: 12.1 FL (ref 9.2–12.9)
POTASSIUM SERPL-SCNC: 4.1 MMOL/L (ref 3.5–5.1)
PROT SERPL-MCNC: 7.3 G/DL (ref 6–8.4)
RBC # BLD AUTO: 3.52 M/UL (ref 4–5.4)
SODIUM SERPL-SCNC: 140 MMOL/L (ref 136–145)
TSH SERPL DL<=0.005 MIU/L-ACNC: 3.53 UIU/ML (ref 0.4–4)
WBC # BLD AUTO: 8.09 K/UL (ref 3.9–12.7)

## 2019-07-16 PROCEDURE — 1101F PR PT FALLS ASSESS DOC 0-1 FALLS W/OUT INJ PAST YR: ICD-10-PCS | Mod: CPTII,S$GLB,, | Performed by: FAMILY MEDICINE

## 2019-07-16 PROCEDURE — 99214 OFFICE O/P EST MOD 30 MIN: CPT | Mod: S$GLB,,, | Performed by: FAMILY MEDICINE

## 2019-07-16 PROCEDURE — 99999 PR PBB SHADOW E&M-EST. PATIENT-LVL III: CPT | Mod: PBBFAC,,, | Performed by: FAMILY MEDICINE

## 2019-07-16 PROCEDURE — 36415 COLL VENOUS BLD VENIPUNCTURE: CPT | Mod: PO

## 2019-07-16 PROCEDURE — 80053 COMPREHEN METABOLIC PANEL: CPT

## 2019-07-16 PROCEDURE — 84443 ASSAY THYROID STIM HORMONE: CPT

## 2019-07-16 PROCEDURE — 99999 PR PBB SHADOW E&M-EST. PATIENT-LVL III: ICD-10-PCS | Mod: PBBFAC,,, | Performed by: FAMILY MEDICINE

## 2019-07-16 PROCEDURE — 99214 PR OFFICE/OUTPT VISIT, EST, LEVL IV, 30-39 MIN: ICD-10-PCS | Mod: S$GLB,,, | Performed by: FAMILY MEDICINE

## 2019-07-16 PROCEDURE — 1101F PT FALLS ASSESS-DOCD LE1/YR: CPT | Mod: CPTII,S$GLB,, | Performed by: FAMILY MEDICINE

## 2019-07-16 PROCEDURE — 85025 COMPLETE CBC W/AUTO DIFF WBC: CPT

## 2019-07-16 NOTE — PROGRESS NOTES
Subjective:       Patient ID: Moira Salter is a 89 y.o. female.    Chief Complaint: Follow-up (on meds)    89 years old female was evaluated at the clinic with dementia associated with behavioral symptoms.  Patient needs supervision for all her activities of daily living.  Family requesting our wheelchair and a hospital bed because patient has significant debility and problems with walking.  Patient needs support for all her transfers.  Patient with decreased kidney function but stable in comparison with previous reports.  Blood pressure today stable.  No chest pain, palpitation orthopnea or PND.    Review of Systems   Constitutional: Negative.    HENT: Negative.    Eyes: Negative.    Respiratory: Negative.    Cardiovascular: Negative for chest pain, palpitations and leg swelling.   Gastrointestinal: Negative.    Genitourinary: Negative.    Musculoskeletal: Positive for gait problem.   Psychiatric/Behavioral: Positive for behavioral problems, confusion, decreased concentration and dysphoric mood. The patient is nervous/anxious.        Objective:      Physical Exam   Constitutional: She appears well-developed and well-nourished. No distress.   HENT:   Head: Normocephalic and atraumatic.   Right Ear: External ear normal.   Left Ear: External ear normal.   Nose: Nose normal.   Mouth/Throat: Oropharynx is clear and moist. No oropharyngeal exudate.   Eyes: Pupils are equal, round, and reactive to light. Conjunctivae and EOM are normal. Right eye exhibits no discharge. Left eye exhibits no discharge. No scleral icterus.   Neck: Normal range of motion. Neck supple. No JVD present. No tracheal deviation present. No thyromegaly present.   Cardiovascular: Normal rate, regular rhythm, normal heart sounds and intact distal pulses. Exam reveals no gallop and no friction rub.   No murmur heard.  Pulmonary/Chest: Effort normal and breath sounds normal. No stridor. No respiratory distress. She has no wheezes. She has no rales. She  exhibits no tenderness.   Abdominal: Soft. Bowel sounds are normal. She exhibits no distension and no mass. There is no tenderness. There is no rebound and no guarding.   Musculoskeletal: Normal range of motion. She exhibits no edema or tenderness.   Lymphadenopathy:     She has no cervical adenopathy.   Neurological: She has normal reflexes. She is disoriented and unresponsive. She displays atrophy. She displays no tremor. No cranial nerve deficit. She exhibits abnormal muscle tone. She displays no seizure activity. Coordination and gait abnormal.   Skin: Skin is warm and dry. No rash noted. She is not diaphoretic. No erythema. No pallor.   Psychiatric: Judgment and thought content normal. Her mood appears not anxious. Her affect is not angry, not blunt, not labile and not inappropriate. Her speech is delayed. She is slowed. Cognition and memory are impaired. She exhibits a depressed mood. She is noncommunicative. She exhibits abnormal recent memory. She exhibits normal remote memory.       Assessment:       1. Dementia with behavioral disturbance, unspecified dementia type    2. CKD (chronic kidney disease) stage 3, GFR 30-59 ml/min    3. Essential hypertension    4. Abnormality of gait    5. Age-related physical debility        Plan:         Moira was seen today for follow-up.    Diagnoses and all orders for this visit:    Dementia with behavioral disturbance, unspecified dementia type  -     Comprehensive metabolic panel; Future  -     TSH; Future  -     CBC auto differential; Future    CKD (chronic kidney disease) stage 3, GFR 30-59 ml/min  -     Comprehensive metabolic panel; Future  -     TSH; Future  -     CBC auto differential; Future    Essential hypertension  -     Comprehensive metabolic panel; Future  -     TSH; Future  -     CBC auto differential; Future    Abnormality of gait  -     HOSPITAL BED FOR HOME USE  -     WHEELCHAIR FOR HOME USE    Age-related physical debility  -     HOSPITAL BED FOR HOME  USE  -     WHEELCHAIR FOR HOME USE     Fall precautions.  Continue monitoring blood pressure at home, low sodium diet.

## 2019-11-02 ENCOUNTER — HOSPITAL ENCOUNTER (INPATIENT)
Facility: HOSPITAL | Age: 84
LOS: 3 days | Discharge: HOSPICE | DRG: 871 | End: 2019-11-05
Attending: EMERGENCY MEDICINE | Admitting: INTERNAL MEDICINE
Payer: MEDICARE

## 2019-11-02 DIAGNOSIS — E03.8 SUBCLINICAL HYPOTHYROIDISM: ICD-10-CM

## 2019-11-02 DIAGNOSIS — F03.91 DEMENTIA WITH BEHAVIORAL DISTURBANCE, UNSPECIFIED DEMENTIA TYPE: ICD-10-CM

## 2019-11-02 DIAGNOSIS — R06.02 SOB (SHORTNESS OF BREATH): ICD-10-CM

## 2019-11-02 DIAGNOSIS — Z51.5 PALLIATIVE CARE ENCOUNTER: ICD-10-CM

## 2019-11-02 DIAGNOSIS — N39.0 COMPLICATED UTI (URINARY TRACT INFECTION): ICD-10-CM

## 2019-11-02 DIAGNOSIS — R65.21 SEPTIC SHOCK: Primary | ICD-10-CM

## 2019-11-02 DIAGNOSIS — E80.6 HYPERBILIRUBINEMIA: ICD-10-CM

## 2019-11-02 DIAGNOSIS — R00.0 TACHYCARDIA: ICD-10-CM

## 2019-11-02 DIAGNOSIS — N20.0 KIDNEY STONE ON LEFT SIDE: ICD-10-CM

## 2019-11-02 DIAGNOSIS — N20.0 NEPHROLITHIASIS: ICD-10-CM

## 2019-11-02 DIAGNOSIS — R78.81 BACTEREMIA: ICD-10-CM

## 2019-11-02 DIAGNOSIS — A41.9 SEPTIC SHOCK: Primary | ICD-10-CM

## 2019-11-02 DIAGNOSIS — Z71.89 COUNSELING REGARDING ADVANCE CARE PLANNING AND GOALS OF CARE: ICD-10-CM

## 2019-11-02 DIAGNOSIS — R65.20 SEVERE SEPSIS: ICD-10-CM

## 2019-11-02 DIAGNOSIS — A41.9 SEVERE SEPSIS: ICD-10-CM

## 2019-11-02 DIAGNOSIS — N17.9 AKI (ACUTE KIDNEY INJURY): ICD-10-CM

## 2019-11-02 LAB
ALLENS TEST: ABNORMAL
APTT BLDCRRT: 32.6 SEC (ref 21–32)
BASOPHILS # BLD AUTO: 0.01 K/UL (ref 0–0.2)
BASOPHILS NFR BLD: 0.1 % (ref 0–1.9)
DELSYS: ABNORMAL
DIFFERENTIAL METHOD: ABNORMAL
EOSINOPHIL # BLD AUTO: 0 K/UL (ref 0–0.5)
EOSINOPHIL NFR BLD: 0 % (ref 0–8)
ERYTHROCYTE [DISTWIDTH] IN BLOOD BY AUTOMATED COUNT: 13.6 % (ref 11.5–14.5)
HCO3 UR-SCNC: 13.9 MMOL/L (ref 24–28)
HCT VFR BLD AUTO: 36.4 % (ref 37–48.5)
HGB BLD-MCNC: 11.8 G/DL (ref 12–16)
INR PPP: 1.2 (ref 0.8–1.2)
LYMPHOCYTES # BLD AUTO: 0.6 K/UL (ref 1–4.8)
LYMPHOCYTES NFR BLD: 5.3 % (ref 18–48)
MCH RBC QN AUTO: 29.5 PG (ref 27–31)
MCHC RBC AUTO-ENTMCNC: 32.4 G/DL (ref 32–36)
MCV RBC AUTO: 91 FL (ref 82–98)
MONOCYTES # BLD AUTO: 0.1 K/UL (ref 0.3–1)
MONOCYTES NFR BLD: 1 % (ref 4–15)
NEUTROPHILS # BLD AUTO: 10.6 K/UL (ref 1.8–7.7)
NEUTROPHILS NFR BLD: 93.6 % (ref 38–73)
PCO2 BLDA: 24.9 MMHG (ref 35–45)
PH SMN: 7.36 [PH] (ref 7.35–7.45)
PLATELET # BLD AUTO: 194 K/UL (ref 150–350)
PMV BLD AUTO: 9.4 FL (ref 9.2–12.9)
PO2 BLDA: 30 MMHG (ref 40–60)
POC BE: -12 MMOL/L
POC SATURATED O2: 57 % (ref 95–100)
POC TCO2: 15 MMOL/L (ref 24–29)
PROTHROMBIN TIME: 12.6 SEC (ref 9–12.5)
RBC # BLD AUTO: 4 M/UL (ref 4–5.4)
SAMPLE: ABNORMAL
WBC # BLD AUTO: 11.58 K/UL (ref 3.9–12.7)

## 2019-11-02 PROCEDURE — 83880 ASSAY OF NATRIURETIC PEPTIDE: CPT

## 2019-11-02 PROCEDURE — 87502 INFLUENZA DNA AMP PROBE: CPT

## 2019-11-02 PROCEDURE — 85025 COMPLETE CBC W/AUTO DIFF WBC: CPT

## 2019-11-02 PROCEDURE — 63600175 PHARM REV CODE 636 W HCPCS: Performed by: EMERGENCY MEDICINE

## 2019-11-02 PROCEDURE — 96361 HYDRATE IV INFUSION ADD-ON: CPT

## 2019-11-02 PROCEDURE — 84100 ASSAY OF PHOSPHORUS: CPT

## 2019-11-02 PROCEDURE — 84443 ASSAY THYROID STIM HORMONE: CPT

## 2019-11-02 PROCEDURE — 83735 ASSAY OF MAGNESIUM: CPT

## 2019-11-02 PROCEDURE — 84484 ASSAY OF TROPONIN QUANT: CPT

## 2019-11-02 PROCEDURE — 82803 BLOOD GASES ANY COMBINATION: CPT

## 2019-11-02 PROCEDURE — 99291 CRITICAL CARE FIRST HOUR: CPT | Mod: 25

## 2019-11-02 PROCEDURE — 12000002 HC ACUTE/MED SURGE SEMI-PRIVATE ROOM

## 2019-11-02 PROCEDURE — 96365 THER/PROPH/DIAG IV INF INIT: CPT

## 2019-11-02 PROCEDURE — 80053 COMPREHEN METABOLIC PANEL: CPT

## 2019-11-02 PROCEDURE — 93005 ELECTROCARDIOGRAM TRACING: CPT

## 2019-11-02 PROCEDURE — 87077 CULTURE AEROBIC IDENTIFY: CPT | Mod: 59

## 2019-11-02 PROCEDURE — 96360 HYDRATION IV INFUSION INIT: CPT | Mod: XS

## 2019-11-02 PROCEDURE — 25000003 PHARM REV CODE 250: Performed by: EMERGENCY MEDICINE

## 2019-11-02 PROCEDURE — 83605 ASSAY OF LACTIC ACID: CPT

## 2019-11-02 PROCEDURE — 87040 BLOOD CULTURE FOR BACTERIA: CPT

## 2019-11-02 PROCEDURE — 85730 THROMBOPLASTIN TIME PARTIAL: CPT

## 2019-11-02 PROCEDURE — 82962 GLUCOSE BLOOD TEST: CPT

## 2019-11-02 PROCEDURE — 85610 PROTHROMBIN TIME: CPT

## 2019-11-02 PROCEDURE — 84439 ASSAY OF FREE THYROXINE: CPT

## 2019-11-02 PROCEDURE — 84145 PROCALCITONIN (PCT): CPT

## 2019-11-02 PROCEDURE — 83690 ASSAY OF LIPASE: CPT

## 2019-11-02 PROCEDURE — 96367 TX/PROPH/DG ADDL SEQ IV INF: CPT

## 2019-11-02 PROCEDURE — 99900035 HC TECH TIME PER 15 MIN (STAT)

## 2019-11-02 PROCEDURE — 96375 TX/PRO/DX INJ NEW DRUG ADDON: CPT

## 2019-11-02 PROCEDURE — 87186 SC STD MICRODIL/AGAR DIL: CPT

## 2019-11-02 RX ORDER — ACETAMINOPHEN 650 MG/1
650 SUPPOSITORY RECTAL
Status: COMPLETED | OUTPATIENT
Start: 2019-11-02 | End: 2019-11-02

## 2019-11-02 RX ADMIN — SODIUM CHLORIDE 1158 ML: 0.9 INJECTION, SOLUTION INTRAVENOUS at 11:11

## 2019-11-02 RX ADMIN — ACETAMINOPHEN 650 MG: 650 SUPPOSITORY RECTAL at 11:11

## 2019-11-03 PROBLEM — N20.0 NEPHROLITHIASIS: Status: ACTIVE | Noted: 2019-11-03

## 2019-11-03 PROBLEM — N39.0 COMPLICATED UTI (URINARY TRACT INFECTION): Status: ACTIVE | Noted: 2019-11-03

## 2019-11-03 PROBLEM — R79.89 ELEVATED TROPONIN: Status: ACTIVE | Noted: 2019-11-03

## 2019-11-03 PROBLEM — E87.29 HIGH ANION GAP METABOLIC ACIDOSIS: Status: ACTIVE | Noted: 2019-11-03

## 2019-11-03 PROBLEM — E80.6 HYPERBILIRUBINEMIA: Status: ACTIVE | Noted: 2019-11-03

## 2019-11-03 PROBLEM — E03.8 SUBCLINICAL HYPOTHYROIDISM: Status: ACTIVE | Noted: 2019-11-03

## 2019-11-03 PROBLEM — N13.9 ACUTE UNILATERAL OBSTRUCTIVE UROPATHY: Status: ACTIVE | Noted: 2019-11-03

## 2019-11-03 PROBLEM — I71.20 THORACIC AORTIC ANEURYSM: Status: ACTIVE | Noted: 2019-11-03

## 2019-11-03 PROBLEM — R65.21 SEPTIC SHOCK: Status: ACTIVE | Noted: 2019-11-03

## 2019-11-03 PROBLEM — N17.9 AKI (ACUTE KIDNEY INJURY): Status: ACTIVE | Noted: 2019-11-03

## 2019-11-03 PROBLEM — A41.9 SEPTIC SHOCK: Status: ACTIVE | Noted: 2019-11-03

## 2019-11-03 LAB
ALBUMIN SERPL BCP-MCNC: 1.7 G/DL (ref 3.5–5.2)
ALBUMIN SERPL BCP-MCNC: 2.2 G/DL (ref 3.5–5.2)
ALP SERPL-CCNC: 125 U/L (ref 55–135)
ALP SERPL-CCNC: 227 U/L (ref 55–135)
ALT SERPL W/O P-5'-P-CCNC: 12 U/L (ref 10–44)
ALT SERPL W/O P-5'-P-CCNC: 13 U/L (ref 10–44)
ANION GAP SERPL CALC-SCNC: 15 MMOL/L (ref 8–16)
ANION GAP SERPL CALC-SCNC: 20 MMOL/L (ref 8–16)
AST SERPL-CCNC: 44 U/L (ref 10–40)
AST SERPL-CCNC: 50 U/L (ref 10–40)
BACTERIA #/AREA URNS HPF: ABNORMAL /HPF
BILIRUB SERPL-MCNC: 1 MG/DL (ref 0.1–1)
BILIRUB SERPL-MCNC: 1.7 MG/DL (ref 0.1–1)
BILIRUB UR QL STRIP: NEGATIVE
BNP SERPL-MCNC: 274 PG/ML (ref 0–99)
BUN SERPL-MCNC: 22 MG/DL (ref 10–30)
BUN SERPL-MCNC: 22 MG/DL (ref 10–30)
CALCIUM SERPL-MCNC: 7.6 MG/DL (ref 8.7–10.5)
CALCIUM SERPL-MCNC: 9 MG/DL (ref 8.7–10.5)
CHLORIDE SERPL-SCNC: 108 MMOL/L (ref 95–110)
CHLORIDE SERPL-SCNC: 112 MMOL/L (ref 95–110)
CLARITY UR: CLEAR
CO2 SERPL-SCNC: 12 MMOL/L (ref 23–29)
CO2 SERPL-SCNC: 14 MMOL/L (ref 23–29)
COLOR UR: ABNORMAL
CREAT SERPL-MCNC: 1.3 MG/DL (ref 0.5–1.4)
CREAT SERPL-MCNC: 1.6 MG/DL (ref 0.5–1.4)
EST. GFR  (AFRICAN AMERICAN): 32 ML/MIN/1.73 M^2
EST. GFR  (AFRICAN AMERICAN): 41 ML/MIN/1.73 M^2
EST. GFR  (NON AFRICAN AMERICAN): 28 ML/MIN/1.73 M^2
EST. GFR  (NON AFRICAN AMERICAN): 36 ML/MIN/1.73 M^2
GLUCOSE SERPL-MCNC: 76 MG/DL (ref 70–110)
GLUCOSE SERPL-MCNC: 90 MG/DL (ref 70–110)
GLUCOSE SERPL-MCNC: 98 MG/DL (ref 70–110)
GLUCOSE UR QL STRIP: NEGATIVE
HGB UR QL STRIP: ABNORMAL
HYALINE CASTS #/AREA URNS LPF: 3 /LPF
INFLUENZA A, MOLECULAR: NEGATIVE
INFLUENZA B, MOLECULAR: NEGATIVE
KETONES UR QL STRIP: NEGATIVE
LACTATE SERPL-SCNC: 1.9 MMOL/L (ref 0.5–2.2)
LACTATE SERPL-SCNC: 11 MMOL/L (ref 0.5–2.2)
LACTATE SERPL-SCNC: 2.2 MMOL/L (ref 0.5–2.2)
LACTATE SERPL-SCNC: 3.6 MMOL/L (ref 0.5–2.2)
LACTATE SERPL-SCNC: 6.2 MMOL/L (ref 0.5–2.2)
LEUKOCYTE ESTERASE UR QL STRIP: ABNORMAL
LIPASE SERPL-CCNC: 24 U/L (ref 4–60)
MAGNESIUM SERPL-MCNC: 1.7 MG/DL (ref 1.6–2.6)
MICROSCOPIC COMMENT: ABNORMAL
NITRITE UR QL STRIP: NEGATIVE
PH UR STRIP: 7 [PH] (ref 5–8)
PHOSPHATE SERPL-MCNC: 2.4 MG/DL (ref 2.7–4.5)
POCT GLUCOSE: 127 MG/DL (ref 70–110)
POCT GLUCOSE: 61 MG/DL (ref 70–110)
POCT GLUCOSE: 63 MG/DL (ref 70–110)
POCT GLUCOSE: 83 MG/DL (ref 70–110)
POCT GLUCOSE: 90 MG/DL (ref 70–110)
POTASSIUM SERPL-SCNC: 3.2 MMOL/L (ref 3.5–5.1)
POTASSIUM SERPL-SCNC: 3.3 MMOL/L (ref 3.5–5.1)
PROCALCITONIN SERPL IA-MCNC: 9.29 NG/ML
PROT SERPL-MCNC: 5.6 G/DL (ref 6–8.4)
PROT SERPL-MCNC: 7.1 G/DL (ref 6–8.4)
PROT UR QL STRIP: ABNORMAL
RBC #/AREA URNS HPF: 25 /HPF (ref 0–4)
SODIUM SERPL-SCNC: 139 MMOL/L (ref 136–145)
SODIUM SERPL-SCNC: 142 MMOL/L (ref 136–145)
SP GR UR STRIP: 1.01 (ref 1–1.03)
SPECIMEN SOURCE: NORMAL
T4 FREE SERPL-MCNC: 1.1 NG/DL (ref 0.71–1.51)
TROPONIN I SERPL DL<=0.01 NG/ML-MCNC: 0.92 NG/ML (ref 0–0.03)
TSH SERPL DL<=0.005 MIU/L-ACNC: 4.02 UIU/ML (ref 0.4–4)
URN SPEC COLLECT METH UR: ABNORMAL
UROBILINOGEN UR STRIP-ACNC: ABNORMAL EU/DL
WBC #/AREA URNS HPF: 15 /HPF (ref 0–5)

## 2019-11-03 PROCEDURE — 83605 ASSAY OF LACTIC ACID: CPT | Mod: 91

## 2019-11-03 PROCEDURE — 27000221 HC OXYGEN, UP TO 24 HOURS

## 2019-11-03 PROCEDURE — 94761 N-INVAS EAR/PLS OXIMETRY MLT: CPT

## 2019-11-03 PROCEDURE — 83605 ASSAY OF LACTIC ACID: CPT

## 2019-11-03 PROCEDURE — 63600175 PHARM REV CODE 636 W HCPCS: Performed by: EMERGENCY MEDICINE

## 2019-11-03 PROCEDURE — 80053 COMPREHEN METABOLIC PANEL: CPT

## 2019-11-03 PROCEDURE — 25000003 PHARM REV CODE 250: Performed by: STUDENT IN AN ORGANIZED HEALTH CARE EDUCATION/TRAINING PROGRAM

## 2019-11-03 PROCEDURE — 63600175 PHARM REV CODE 636 W HCPCS: Performed by: STUDENT IN AN ORGANIZED HEALTH CARE EDUCATION/TRAINING PROGRAM

## 2019-11-03 PROCEDURE — 87077 CULTURE AEROBIC IDENTIFY: CPT

## 2019-11-03 PROCEDURE — 63600175 PHARM REV CODE 636 W HCPCS: Performed by: INTERNAL MEDICINE

## 2019-11-03 PROCEDURE — S0030 INJECTION, METRONIDAZOLE: HCPCS | Performed by: EMERGENCY MEDICINE

## 2019-11-03 PROCEDURE — 87088 URINE BACTERIA CULTURE: CPT

## 2019-11-03 PROCEDURE — S0030 INJECTION, METRONIDAZOLE: HCPCS | Performed by: STUDENT IN AN ORGANIZED HEALTH CARE EDUCATION/TRAINING PROGRAM

## 2019-11-03 PROCEDURE — 21400001 HC TELEMETRY ROOM

## 2019-11-03 PROCEDURE — 25000003 PHARM REV CODE 250: Performed by: EMERGENCY MEDICINE

## 2019-11-03 PROCEDURE — 99221 1ST HOSP IP/OBS SF/LOW 40: CPT | Mod: ,,, | Performed by: UROLOGY

## 2019-11-03 PROCEDURE — 87186 SC STD MICRODIL/AGAR DIL: CPT

## 2019-11-03 PROCEDURE — 87086 URINE CULTURE/COLONY COUNT: CPT

## 2019-11-03 PROCEDURE — 36415 COLL VENOUS BLD VENIPUNCTURE: CPT

## 2019-11-03 PROCEDURE — 81000 URINALYSIS NONAUTO W/SCOPE: CPT

## 2019-11-03 PROCEDURE — 99221 PR INITIAL HOSPITAL CARE,LEVL I: ICD-10-PCS | Mod: ,,, | Performed by: UROLOGY

## 2019-11-03 RX ORDER — CEFEPIME HYDROCHLORIDE 2 G/50ML
2 INJECTION, SOLUTION INTRAVENOUS
Status: DISCONTINUED | OUTPATIENT
Start: 2019-11-03 | End: 2019-11-03

## 2019-11-03 RX ORDER — POTASSIUM CHLORIDE 7.45 MG/ML
10 INJECTION INTRAVENOUS
Status: COMPLETED | OUTPATIENT
Start: 2019-11-03 | End: 2019-11-03

## 2019-11-03 RX ORDER — METRONIDAZOLE 500 MG/100ML
500 INJECTION, SOLUTION INTRAVENOUS
Status: COMPLETED | OUTPATIENT
Start: 2019-11-03 | End: 2019-11-03

## 2019-11-03 RX ORDER — ACETAMINOPHEN 325 MG/1
650 TABLET ORAL EVERY 8 HOURS PRN
Status: DISCONTINUED | OUTPATIENT
Start: 2019-11-03 | End: 2019-11-05 | Stop reason: HOSPADM

## 2019-11-03 RX ORDER — SODIUM CHLORIDE, SODIUM LACTATE, POTASSIUM CHLORIDE, CALCIUM CHLORIDE 600; 310; 30; 20 MG/100ML; MG/100ML; MG/100ML; MG/100ML
INJECTION, SOLUTION INTRAVENOUS CONTINUOUS
Status: DISCONTINUED | OUTPATIENT
Start: 2019-11-03 | End: 2019-11-05 | Stop reason: HOSPADM

## 2019-11-03 RX ORDER — GLUCAGON 1 MG
1 KIT INJECTION
Status: DISCONTINUED | OUTPATIENT
Start: 2019-11-03 | End: 2019-11-05 | Stop reason: HOSPADM

## 2019-11-03 RX ORDER — METRONIDAZOLE 500 MG/100ML
500 INJECTION, SOLUTION INTRAVENOUS
Status: DISCONTINUED | OUTPATIENT
Start: 2019-11-03 | End: 2019-11-04

## 2019-11-03 RX ORDER — POTASSIUM CHLORIDE 20 MEQ/15ML
40 SOLUTION ORAL
Status: DISCONTINUED | OUTPATIENT
Start: 2019-11-03 | End: 2019-11-03

## 2019-11-03 RX ORDER — HEPARIN SODIUM 5000 [USP'U]/ML
5000 INJECTION, SOLUTION INTRAVENOUS; SUBCUTANEOUS EVERY 8 HOURS
Status: DISCONTINUED | OUTPATIENT
Start: 2019-11-03 | End: 2019-11-05 | Stop reason: HOSPADM

## 2019-11-03 RX ORDER — IBUPROFEN 200 MG
24 TABLET ORAL
Status: DISCONTINUED | OUTPATIENT
Start: 2019-11-03 | End: 2019-11-05 | Stop reason: HOSPADM

## 2019-11-03 RX ORDER — IBUPROFEN 200 MG
16 TABLET ORAL
Status: DISCONTINUED | OUTPATIENT
Start: 2019-11-03 | End: 2019-11-05 | Stop reason: HOSPADM

## 2019-11-03 RX ORDER — POLYETHYLENE GLYCOL 3350 17 G/17G
17 POWDER, FOR SOLUTION ORAL DAILY
Status: DISCONTINUED | OUTPATIENT
Start: 2019-11-03 | End: 2019-11-05 | Stop reason: HOSPADM

## 2019-11-03 RX ADMIN — VANCOMYCIN HYDROCHLORIDE 750 MG: 750 INJECTION, POWDER, LYOPHILIZED, FOR SOLUTION INTRAVENOUS at 02:11

## 2019-11-03 RX ADMIN — POTASSIUM CHLORIDE 10 MEQ: 7.46 INJECTION, SOLUTION INTRAVENOUS at 11:11

## 2019-11-03 RX ADMIN — DEXTROSE 125 ML: 10 SOLUTION INTRAVENOUS at 06:11

## 2019-11-03 RX ADMIN — SODIUM CHLORIDE, SODIUM LACTATE, POTASSIUM CHLORIDE, AND CALCIUM CHLORIDE 1000 ML: .6; .31; .03; .02 INJECTION, SOLUTION INTRAVENOUS at 06:11

## 2019-11-03 RX ADMIN — CEFTRIAXONE 1 G: 1 INJECTION, SOLUTION INTRAVENOUS at 12:11

## 2019-11-03 RX ADMIN — POTASSIUM CHLORIDE 10 MEQ: 7.46 INJECTION, SOLUTION INTRAVENOUS at 10:11

## 2019-11-03 RX ADMIN — METRONIDAZOLE 500 MG: 500 INJECTION, SOLUTION INTRAVENOUS at 03:11

## 2019-11-03 RX ADMIN — METRONIDAZOLE 500 MG: 500 SOLUTION INTRAVENOUS at 12:11

## 2019-11-03 RX ADMIN — HEPARIN SODIUM 5000 UNITS: 5000 INJECTION, SOLUTION INTRAVENOUS; SUBCUTANEOUS at 03:11

## 2019-11-03 RX ADMIN — HEPARIN SODIUM 5000 UNITS: 5000 INJECTION, SOLUTION INTRAVENOUS; SUBCUTANEOUS at 11:11

## 2019-11-03 RX ADMIN — HEPARIN SODIUM 5000 UNITS: 5000 INJECTION, SOLUTION INTRAVENOUS; SUBCUTANEOUS at 06:11

## 2019-11-03 RX ADMIN — CEFEPIME 2 G: 2 INJECTION, POWDER, FOR SOLUTION INTRAVENOUS at 09:11

## 2019-11-03 RX ADMIN — SODIUM CHLORIDE, SODIUM LACTATE, POTASSIUM CHLORIDE, AND CALCIUM CHLORIDE: .6; .31; .03; .02 INJECTION, SOLUTION INTRAVENOUS at 09:11

## 2019-11-03 RX ADMIN — METRONIDAZOLE 500 MG: 500 INJECTION, SOLUTION INTRAVENOUS at 09:11

## 2019-11-03 RX ADMIN — CEFTRIAXONE 1 G: 1 INJECTION, SOLUTION INTRAVENOUS at 01:11

## 2019-11-03 RX ADMIN — SODIUM CHLORIDE, SODIUM LACTATE, POTASSIUM CHLORIDE, AND CALCIUM CHLORIDE: .6; .31; .03; .02 INJECTION, SOLUTION INTRAVENOUS at 12:11

## 2019-11-03 RX ADMIN — SODIUM CHLORIDE 1000 ML: 0.9 INJECTION, SOLUTION INTRAVENOUS at 12:11

## 2019-11-03 RX ADMIN — POTASSIUM CHLORIDE 10 MEQ: 7.46 INJECTION, SOLUTION INTRAVENOUS at 09:11

## 2019-11-03 NOTE — H&P
[facility] Internal Medicine H&P    Admitting Team: Hospital Team A  Attending Physician: Pop  Resident: Roman  Intern: Venkat    Date of Admit: 11/2/2019    Chief Complaint     Increased lethargy and decreased intake x 1 day    Subjective:      History of Present Illness:  Moira Salter is a 89 y.o. with severe dementia (bed bound, without any idea who family members, mumbles insensibly most of the time), CKD and GERD who presents from home with increased lethargy and poor oral intake for a day    The patient was in their usual state of health--bed bound with very poor quality of life--until yesterday when her family noticed that she wasn't really eating as much as usual and seemed more sleepy than usual. She was noted to have an irregular breathing pattern as well but no recent cough or shortness of breath was noted. As far as family was able to report, she had been herself until yesterday. EMS was called and she was found to be tachycardic hypotensive and she was emergently brought to the ED. She is unable to provide any meaningful history.     Past Medical History:  Past Medical History:   Diagnosis Date    Dementia     Hypertension     Osteoporosis        Past Surgical History:  No past surgical history on file.    Allergies:  Review of patient's allergies indicates:   Allergen Reactions    Penicillins      Other reaction(s): Rash       Home Medications:  Prior to Admission medications    Medication Sig Start Date End Date Taking? Authorizing Provider   loratadine (CLARITIN) 10 mg tablet Take 10 mg by mouth once daily.    Historical Provider, MD   omeprazole (PRILOSEC) 40 MG capsule Take 1 capsule (40 mg total) by mouth before breakfast. 1 Capsule, Delayed Release(E.C.) Oral Every day.  30 minutes before breakfast 3/10/16   Forest Angeles MD   polyethylene glycol (GLYCOLAX) 17 gram/dose powder Take 17 g by mouth once daily. 5/31/18   Forest Angeles MD   tobramycin sulfate 0.3% (TOBREX) 0.3 %  "ophthalmic solution Place 1 drop into the right eye every 4 (four) hours. 18   Forest Angeles MD       Family History:  Family History   Problem Relation Age of Onset    Melanoma Neg Hx        Social History:  Social History     Tobacco Use    Smoking status: Never Smoker    Smokeless tobacco: Never Used   Substance Use Topics    Alcohol use: No    Drug use: No       Review of Systems:  Unable to assess 2/2 mental status    Health Maintaince :   Primary Care Physician: Bridgette    Immunizations:   Unable to assess    Cancer Screening:  No indication      Objective:   Last 24 Hour Vital Signs:  BP  Min: 72/52  Max: 109/61  Temp  Av.3 °F (38.5 °C)  Min: 98.7 °F (37.1 °C)  Max: 103.1 °F (39.5 °C)  Pulse  Av.5  Min: 100  Max: 183  Resp  Av.8  Min: 21  Max: 27  SpO2  Av.5 %  Min: 98 %  Max: 100 %  Height  Av' 8" (142.2 cm)  Min: 4' 8" (142.2 cm)  Max: 4' 8" (142.2 cm)  Weight  Av.6 kg (85 lb)  Min: 38.6 kg (85 lb)  Max: 38.6 kg (85 lb)  Body mass index is 19.06 kg/m².  No intake/output data recorded.    Physical Examination:  Gen: awake, does not follow commands, mumbles insensibly   HEENT: PERRL, EOMi, oropharynx clear, dry and without exudate, JVP 6cm, no cervical lymphadenopathy   CVS: tachycardic, regular rhythm, normal S1/S2, without S3/S4 or murmurs  Resp: no use of accessory muscles, no wheezes, no rhonchi, no rales  Abdo: soft, non-tender, non-distended, bowel sounds +, without guarding or rebound; flank tenderness on L  Neuro: moves all extremities, no abnormal tone  Ext: no edema, no cyanosis  Skin: no rashes , sacral decubs noted (stage 2-3)      Laboratory:  Most Recent Data:  CBC:   Lab Results   Component Value Date    WBC 11.58 2019    HGB 11.8 (L) 2019    HCT 36.4 (L) 2019     2019    MCV 91 2019    RDW 13.6 2019     WBC Differential: 93.6 % N, 0 % Bands, 5.3 % L, 1.0 % M, 0 % Eo, 0.1 % Baso, no additional " cells seen  BMP:   Lab Results   Component Value Date     11/02/2019    K 3.2 (L) 11/02/2019     11/02/2019    CO2 14 (L) 11/02/2019    BUN 22 11/02/2019    CREATININE 1.6 (H) 11/02/2019    GLU 98 11/02/2019    CALCIUM 9.0 11/02/2019    MG 1.7 11/02/2019    PHOS 2.4 (L) 11/02/2019     LFTs:   Lab Results   Component Value Date    PROT 7.1 11/02/2019    ALBUMIN 2.2 (L) 11/02/2019    BILITOT 1.7 (H) 11/02/2019    AST 44 (H) 11/02/2019    ALKPHOS 227 (H) 11/02/2019    ALT 13 11/02/2019     Coags:   Lab Results   Component Value Date    INR 1.2 11/02/2019     FLP:   Lab Results   Component Value Date    CHOL 190 07/13/2017    HDL 43 07/13/2017    LDLCALC 125.2 07/13/2017    TRIG 109 07/13/2017    CHOLHDL 22.6 07/13/2017     DM:   Lab Results   Component Value Date    LDLCALC 125.2 07/13/2017    CREATININE 1.6 (H) 11/02/2019     Thyroid:   Lab Results   Component Value Date    TSH 4.023 (H) 11/02/2019    FREET4 1.10 11/02/2019    V7ZATJS 7.0 03/31/2010    D9JJLUL 91 03/31/2010     Anemia:   Lab Results   Component Value Date    VXBGEEXE74 564 01/17/2012    FOLATE 12.0 01/17/2012     Cardiac:   Lab Results   Component Value Date    TROPONINI 0.923 (H) 11/02/2019     (H) 11/02/2019     Urinalysis:   Lab Results   Component Value Date    LABURIN ESCHERICHIA COLI  >100,000 cfu/ml   01/13/2017    COLORU LT. GREEN 11/03/2019    SPECGRAV 1.015 11/03/2019    NITRITE Negative 11/03/2019    KETONESU Negative 11/03/2019    UROBILINOGEN 2.0-3.0 (A) 11/03/2019    WBCUA 15 (H) 11/03/2019       Trended Lab Data:  Recent Labs   Lab 11/02/19  2324   WBC 11.58   HGB 11.8*   HCT 36.4*      MCV 91   RDW 13.6      K 3.2*      CO2 14*   BUN 22   CREATININE 1.6*   GLU 98   PROT 7.1   ALBUMIN 2.2*   BILITOT 1.7*   AST 44*   ALKPHOS 227*   ALT 13       Trended Cardiac Data:  Recent Labs   Lab 11/02/19  2324   TROPONINI 0.923*   *       Microbiology Data:  UCx and Blood cultures pending    Other  Results:  EKG (my interpretation): sinus tachycardia without dynamic ischemic changes    Radiology:  Imaging Results          CT Chest Abdoment Pelvis Without Contrast (XPD) (Final result)  Result time 11/03/19 01:00:23    Final result by Betzaida Hatch MD (11/03/19 01:00:23)                 Impression:      4.1 cm aneurysmal dilatation of the ascending thoracic aorta.  Ectasias of the abdominal aorta.  Advanced atherosclerotic disease.    15 x 7 mm left UPJ calculus with mild hydronephrosis.    Moderately large rectal and large rectal stool.  Findings suggest constipation and/or fecal impaction.      Electronically signed by: Betzaida Hatch  Date:    11/03/2019  Time:    01:00             Narrative:    EXAMINATION:  CT OF CHEST ABDOMEN PELVIS WITHOUT    CLINICAL HISTORY:  89-year-old presenting with irregular breathing and altered mental status.    TECHNIQUE:  5 mm unenhanced axial images were obtained from the lung apices through the greater trochanters.    COMPARISON:  None.    FINDINGS:  In the chest, there is no contusion or pneumothorax. There are no displaced rib fractures. There is no pleural or pericardial effusion. The aorta is intact. The heart size is within normal limits. There is moderate bibasilar atelectasis.  There is 4.1 cm aneurysmal dilatation of the ascending thoracic aorta.  The thoracic aorta is tortuous.    In the abdomen, there is a 15 x 7 mm left UPJ calculus with resultant mild hydronephrosis.  The liver, spleen, pancreas, kidneys, and adrenal glands are unremarkable.  The gallbladder is surgically absent.  There is no pneumoperitoneum or free fluid detected.  At the level of the left renal artery, there is ectasia of the abdominal aorta measuring up to 2.6 cm.  Moderate atherosclerotic calcifications are noted.    In the pelvis, there is no free fluid.  There is moderately large colonic stool and large rectal stool in the moderately patulous rectum.  The urinary bladder is depressed  come by Sheridan catheter.                               CT Head Without Contrast (Final result)  Result time 11/03/19 00:47:13    Final result by Betzaida Hatch MD (11/03/19 00:47:13)                 Impression:      No acute intracranial abnormality detected. Stable cerebral atrophy and chronic small vessel ischemic changes.    Acute right sphenoid sinusitis.    Gas in the venous sinuses.      Electronically signed by: Betzaida Hatch  Date:    11/03/2019  Time:    00:47             Narrative:    EXAMINATION:  CT OF THE HEAD WITHOUT    CLINICAL HISTORY:  Confusion/delirium, altered LOC, unexplained;    TECHNIQUE:  5 mm unenhanced axial images were obtained from the skull base to the vertex.    COMPARISON:  01/03/2017    FINDINGS:  Moderate cerebral atrophy and chronic small vessel ischemic changes are present.  There is no acute intracranial hemorrhage, territorial infarct or mass effect, or midline shift.  There is gas in the venous sinuses, which has been associated with IV lines, trauma, surgery and iatrogenic causes. In the visualized paranasal sinuses, there is a small fluid level in the right sphenoid sinus.  There is senescent calcifications of the basal ganglia.  Bilateral scleral banding is present.                                     Assessment:     Moira Salter is a 89 y.o. female with:  Patient Active Problem List    Diagnosis Date Noted    Septic shock 11/03/2019    Nephrolithiasis 11/03/2019    Acute unilateral obstructive uropathy 11/03/2019    Complicated UTI (urinary tract infection) 11/03/2019    ANA (acute kidney injury) 11/03/2019    High anion gap metabolic acidosis 11/03/2019    Hyperbilirubinemia 11/03/2019    Elevated troponin 11/03/2019    Subclinical hypothyroidism 11/03/2019    Syncope 01/03/2017    Dementia 07/24/2012    GERD (gastroesophageal reflux disease) 07/24/2012    Hypertension 07/24/2012    Depression 07/24/2012        Plan:     Septic Shock 2/2 acute  complicated UTI with unilateral obstructive uropathy  - 89F (bedbound, severe dementia) presents with one day of poor PO intake and lethargy, noticed to be cold and have abnormal respiratory pattern by family  - febrile to 103 in ED, with tachycardia and hypotension, without leukocytosis but lactic of 11 and procal 9.29 and evidence of end organ damage (elevated trop and ANA); UA suggestive of infection and fede pus noted in reyes   - CT chest/abdo/pelvis with large (15 x 7 mm) renal calculus + mild hydronephrosis on L  - has sacral decub (stage 2-3) though less likely source  - s/p sepsis bolus, vanc, rocephin and flagyl in ED with improvement in BP  - plan for continued vanc and flagyl with exchange of rocephin for cefepime and non-emergent urology consult for source control in AM (were given option for transfer for emergent urologic procedure)  - blood cultures pending  - after discussion with family members and discussions among themselves, will change code status to DNR, will not admit patient to the ICU as they do not want invasive lines or vasopressors, if these were required    Elevated troponin  - presents 0.923 from normal baseline, suspect demand in setting of septic shock  - no ischemic EKG changes    ANA on CKD  - CR 1.6 from baseline 1.0  - baseline around 50, presents at 28   - suspect more related to volume depletion/sepsis than unilateral obstruction and expect improvement in morning labs after resucitation     AGMA  - 2/2 lactic acidosis, will trend lactate, suspect improvement after fluid resuscitation     Hyperbilirubinemia  - 1.7 from normal baseline  - unclear etiology though ALP elevated from normal along with mild bump in AST  - doubt biliary obstruction, CT documents normal pancreas, liver and gallbladder absent  - if worsening, can consider US to check CBD    Stage II-III decubitus ulcer  - wound care consult    Aortic aneurysm, thoracic  - noted on CT    Protein gap  - noted on labs,  defer work up    Dementia, severe  - completely bed bound and without any concept of who her family members are anymore  - family aware of her poor quality of life and all were in agreement that a less aggressive strategy in optimal course    Diet: NPO  Ppx: heparin  Dispo: pending urology eval, floor with antibiotics       Code Status:     DNR    Cyril Owens MD  [facility] Internal Medicine [id number]    [facility] Medicine Hospitalist Pager numbers:   [facility] Hospitalist Medicine Team A (Pop/Codey): [phone]  [facility] Hospitalist Medicine Team B (Caryn/Mae):  [phone]

## 2019-11-03 NOTE — PROGRESS NOTES
Made MD team aware of pt bp 74/53. Team stated they would make changes to current iv fluids. Will continue to monitor.

## 2019-11-03 NOTE — PLAN OF CARE
Patient arrived to room from ED via stretcher. Several family members at the bedside. Medications administered as ordered. Patient with one moderate sized BM overnight. Encouraged to call with needs or concerns. Will continue to monitor.

## 2019-11-03 NOTE — ED NOTES
Pt has family at bedside, pt Is resting in bed, with antibiotics running, no distress  Noted, vitals cont. Monitored

## 2019-11-03 NOTE — PROGRESS NOTES
Notified MD of patient's low BP and that she seems to be unable to take oral potassium ordered this AM. MD stated he would look over the patient's chart.

## 2019-11-03 NOTE — PROGRESS NOTES
Pharmacist Renal Dose Adjustment Note    Moira Salter is a 89 y.o. female being treated with the medication cefepime.    Patient Data:    Vital Signs (Most Recent):  Temp: (!) 102 °F (38.9 °C) (11/02/19 2315)  Pulse: 107 (11/03/19 0147)  Resp: (!) 24 (11/03/19 0147)  BP: 103/63 (11/03/19 0147)  SpO2: 100 % (11/03/19 0147)   Vital Signs (72h Range):  Temp:  [98.7 °F (37.1 °C)-103.1 °F (39.5 °C)]   Pulse:  [100-183]   Resp:  [21-27]   BP: ()/(52-72)   SpO2:  [98 %-100 %]      Recent Labs   Lab 11/02/19  2324   CREATININE 1.6*     Serum creatinine: 1.6 mg/dL (H) 11/02/19 2324  Estimated creatinine clearance: 13.7 mL/min (A)    Medication: Cefepime 2000 mg IV q12h will be changed to cefepime 2000 mg IV q24h.    Pharmacist's Name: Nitish Monsalve  Pharmacist's Extension: [phone]

## 2019-11-03 NOTE — PROGRESS NOTES
Pharmacokinetic Initial Assessment: IV Vancomycin    Assessment/Plan:    Initiate intravenous vancomycin with loading dose of 750 mg once with subsequent doses when random concentrations are less than 20 mcg/mL  Desired empiric serum trough concentration is 15 to 20 mcg/mL  Draw vancomycin random level on 11/4/19 at 0400.  Pharmacy will continue to follow and monitor vancomycin.      Please contact pharmacy at extension [phone] with any questions regarding this assessment.     Thank you for the consult,   Nitish Monsalve       Patient brief summary:  Moira Salter is a 89 y.o. female initiated on antimicrobial therapy with IV Vancomycin for treatment of suspected sepsis    Drug Allergies:   Review of patient's allergies indicates:   Allergen Reactions    Penicillins      Other reaction(s): Rash       Actual Body Weight:   38.6 kg    Renal Function:   Estimated Creatinine Clearance: 13.7 mL/min (A) (based on SCr of 1.6 mg/dL (H)).,     Dialysis Method (if applicable):  N/A    CBC (last 72 hours):  Recent Labs   Lab Result Units 11/02/19  2324   WBC K/uL 11.58   Hemoglobin g/dL 11.8*   Hematocrit % 36.4*   Platelets K/uL 194   Gran% % 93.6*   Lymph% % 5.3*   Mono% % 1.0*   Eosinophil% % 0.0   Basophil% % 0.1   Differential Method  Automated       Metabolic Panel (last 72 hours):  Recent Labs   Lab Result Units 11/02/19  2324 11/03/19  0038   Sodium mmol/L 142  --    Potassium mmol/L 3.2*  --    Chloride mmol/L 108  --    CO2 mmol/L 14*  --    Glucose mg/dL 98  --    Glucose, UA   --  Negative   BUN, Bld mg/dL 22  --    Creatinine mg/dL 1.6*  --    Albumin g/dL 2.2*  --    Total Bilirubin mg/dL 1.7*  --    Alkaline Phosphatase U/L 227*  --    AST U/L 44*  --    ALT U/L 13  --    Magnesium mg/dL 1.7  --    Phosphorus mg/dL 2.4*  --        Drug levels (last 3 results):  No results for input(s): VANCOMYCINRA, VANCOMYCINPE, VANCOMYCINTR in the last 72 hours.    Microbiologic Results:  Microbiology Results (last 7 days)        Procedure Component Value Units Date/Time    Urine culture [[id number]] Collected:  11/03/19 0038    Order Status:  No result Specimen:  Urine Updated:  11/03/19 0149    Blood culture x two cultures. Draw prior to antibiotics. [[id number]] Collected:  11/02/19 2325    Order Status:  Sent Specimen:  Blood from Peripheral, Lower Arm, Left Updated:  11/03/19 0147    Blood culture x two cultures. Draw prior to antibiotics. [[id number]] Collected:  11/02/19 2328    Order Status:  Sent Specimen:  Blood from Peripheral, Antecubital, Left Updated:  11/03/19 0147    Influenza A & B by Molecular [[id number]] Collected:  11/02/19 2326    Order Status:  Completed Specimen:  Nasopharyngeal Swab Updated:  11/03/19 0118     Influenza A, Molecular Negative     Influenza B, Molecular Negative     Flu A & B Source Nasal swab    Blood culture #1 **CANNOT BE ORDERED STAT** [[id number]]     Order Status:  Canceled Specimen:  Blood

## 2019-11-03 NOTE — ED NOTES
Family at bedside, pt is responding appropriately, no distress noted, iv site x 3 no  Complications

## 2019-11-03 NOTE — NURSING
Dr Wade notified of lactate is 11, Potassium 3.2 and phos 2.4. Dr Wade is aware of labs.  Will continue to be available as needed

## 2019-11-03 NOTE — CONSULTS
CC: left UPJ calculus with mild hydronephrosis    oMira Salter is a 89 y.o. woman who is here for the evaluation of Altered Mental Status (ems reports being called out for pt breathing too fast, then too slow per family. upon arrival ems sternum rubbed pt to wake. BP was 80/palp. EMs administered fluids. )    A new pt referred by IP Dr. Lord at [facility] Internal Medicine team.   Dr. Cyril Owens, a resident in Medicine discussed the pt with me.  Pt is admitted for complicated UTI with possible septic shock with severe dementia, bedridden and DNR status.  CT revealed incidental finding of a large left UPJ calculus with mild hydronephrosis.  It is reported as 15 x 7 mm stone but according to my review and measurement of the CT, this left UPJ stone is 37 x 15 mm large stone.  Left hydronephrosis is mild and there is no evidence of pyelonephritis such as acute swelling or inflammation of the involved left kidney.    Past Medical History:   Diagnosis Date    Arthritis     Dementia     Hypertension     Osteoporosis      Past Surgical History:   Procedure Laterality Date    ABDOMINAL SURGERY       Social History     Tobacco Use    Smoking status: Never Smoker    Smokeless tobacco: Never Used   Substance Use Topics    Alcohol use: No    Drug use: No     Family History   Problem Relation Age of Onset    No Known Problems Mother     No Known Problems Father     No Known Problems Maternal Aunt     No Known Problems Maternal Uncle     No Known Problems Paternal Aunt     No Known Problems Paternal Uncle     No Known Problems Maternal Grandmother     No Known Problems Maternal Grandfather     No Known Problems Paternal Grandmother     No Known Problems Paternal Grandfather     Melanoma Neg Hx      Allergy:  Review of patient's allergies indicates:   Allergen Reactions    Penicillins      Other reaction(s): Rash     [unfilled]  Review of Systems   ROS  Physical Exam     Vitals:    11/03/19 0716   BP: (!)  84/53   Pulse: 97   Resp: 16   Temp: 98 °F (36.7 °C)     Physical Exam      LABS:  Lab Results   Component Value Date    CREATININE 1.6 (H) 11/02/2019    CREATININE 1.0 07/16/2019    CREATININE 1.0 05/31/2018     Urine Culture, Routine   Date Value Ref Range Status   01/13/2017 ESCHERICHIA COLI  >100,000 cfu/ml    Final     No results found for: HGBA1C  Radiology:  I reviewed her CT on admission in detail.  Assessment and Plan:  Left UPJ stone with mild hydronephrosis    The measurement of this stone is actually more than 15 x 7 mm that was reported.  I measured it 15 x 37 mm in size.  I am not sure whether I can pass the left ureteral stent if needed.    I explained to Dr. Owens that given pt's DNR status and the finding of no acute evidence of severe infection, we may be able to manage her conservative for now.  However, in case there is a urologic intervention needed, I recommended that we should consider left nephrostomy tube placement by Interventional radiology to decompress left hydronephrosis, and subsequent either left PCNL or a long term left neph tube exchange.  Because this nature, pt and her family members must understand all their options and decide how much they would like to pursue for urologic intervention.  I also suggest that the tem and family may discuss the patient care with Dr. Morris, a regular urologist at [facility] tomorrow.  I can be reached at my cell phone or pager if needed.    Thank you,    Harrison Trinidad MD  Staff Urologist    [facility]  Department of Urology  [address] [place] [id number]    Tel: [phone]  Email: [email or web address]

## 2019-11-03 NOTE — ED PROVIDER NOTES
Encounter Date: 11/2/2019    SCRIBE #1 NOTE: Yue PRADHAN, am scribing for, and in the presence of, Josh Rushing MD.       History     Chief Complaint   Patient presents with    Altered Mental Status     ems reports being called out for pt breathing too fast, then too slow per family. upon arrival ems sternum rubbed pt to wake. BP was 80/palp. EMs administered fluids.      Time seen by provider: 11:06 PM    This is a 89 y.o. female with PMHx of dementia, osteoporosis, and HTN who presents via EMS due to irregular breathing and AMS. Since the patient has a hx of dementia and is not able to answer questions at exam, the patient's hx is provided by EMS and family. EMS reports that they were called out because the patient had been breathing fast and then slow. Upon arrival EMS found the patient to be cool to touch, and her pulses while palpable were erratic. The family reports that she has been eating much less today and only drank about 2 oz of water. Her relative found her breathing heavily and her body was cold to touch.               The history is provided by the EMS personnel. The history is limited by the condition of the patient (dementia).     Review of patient's allergies indicates:   Allergen Reactions    Penicillins      Other reaction(s): Rash     Past Medical History:   Diagnosis Date    Arthritis     Dementia     Hypertension     Osteoporosis      Past Surgical History:   Procedure Laterality Date    ABDOMINAL SURGERY       Family History   Problem Relation Age of Onset    No Known Problems Mother     No Known Problems Father     No Known Problems Maternal Aunt     No Known Problems Maternal Uncle     No Known Problems Paternal Aunt     No Known Problems Paternal Uncle     No Known Problems Maternal Grandmother     No Known Problems Maternal Grandfather     No Known Problems Paternal Grandmother     No Known Problems Paternal Grandfather     Melanoma Neg Hx      Social History      Tobacco Use    Smoking status: Never Smoker    Smokeless tobacco: Never Used   Substance Use Topics    Alcohol use: No    Drug use: No     Review of Systems   Unable to perform ROS: Dementia       Physical Exam     Initial Vitals [11/02/19 2303]   BP Pulse Resp Temp SpO2   109/61 (!) 145 (!) 25 98.7 °F (37.1 °C) 98 %      MAP       --         Physical Exam    Nursing note and vitals reviewed.  Constitutional: She appears well-developed and well-nourished. She is not diaphoretic. She appears cachectic. No distress.   Responding to painful stimuli   HENT:   Head: Normocephalic and atraumatic.   Mouth/Throat: Oropharynx is clear and moist.   Eyes: Conjunctivae and EOM are normal. Pupils are equal, round, and reactive to light.   Neck: Normal range of motion. Neck supple.   Cardiovascular: Regular rhythm and normal heart sounds. Tachycardia present.  Exam reveals no gallop and no friction rub.    No murmur heard.  Tachycardic at 146 bpm   Pulmonary/Chest: Breath sounds normal. She has no wheezes. She has no rhonchi. She has no rales.   Abdominal: Soft. Bowel sounds are normal. There is no tenderness. There is no rebound and no guarding.   Musculoskeletal: Normal range of motion. She exhibits no edema or tenderness.   Lymphadenopathy:     She has no cervical adenopathy.   Neurological: She has normal strength.   Skin: Skin is warm and dry. Capillary refill takes less than 2 seconds. No rash noted.         ED Course   Critical Care  Date/Time: 11/2/2019 11:40 PM  Performed by: Josh Rushing MD  Authorized by: Josh Rushing MD   Total critical care time (exclusive of procedural time) : 65 minutes  Critical care time was exclusive of separately billable procedures and treating other patients and teaching time.  Critical care was necessary to treat or prevent imminent or life-threatening deterioration of the following conditions: sepsis.  Comments: Approximately 65 min of critical care time including  management of undifferentiated septic shock, lactic acidosis, discussing consultants and family        Labs Reviewed   B-TYPE NATRIURETIC PEPTIDE - Abnormal; Notable for the following components:       Result Value     (*)     All other components within normal limits   CBC W/ AUTO DIFFERENTIAL - Abnormal; Notable for the following components:    Hemoglobin 11.8 (*)     Hematocrit 36.4 (*)     Gran # (ANC) 10.6 (*)     Lymph # 0.6 (*)     Mono # 0.1 (*)     Gran% 93.6 (*)     Lymph% 5.3 (*)     Mono% 1.0 (*)     All other components within normal limits   COMPREHENSIVE METABOLIC PANEL - Abnormal; Notable for the following components:    Potassium 3.2 (*)     CO2 14 (*)     Creatinine 1.6 (*)     Albumin 2.2 (*)     Total Bilirubin 1.7 (*)     Alkaline Phosphatase 227 (*)     AST 44 (*)     Anion Gap 20 (*)     eGFR if  32 (*)     eGFR if non  28 (*)     All other components within normal limits   TROPONIN I - Abnormal; Notable for the following components:    Troponin I 0.923 (*)     All other components within normal limits   TSH - Abnormal; Notable for the following components:    TSH 4.023 (*)     All other components within normal limits   LACTIC ACID, PLASMA - Abnormal; Notable for the following components:    Lactate (Lactic Acid) 11.0 (*)     All other components within normal limits    Narrative:     LA  critical result(s) called and verbal readback obtained from Long Crews RN, 11/03/2019 00:09   URINALYSIS, REFLEX TO URINE CULTURE - Abnormal; Notable for the following components:    Protein, UA 1+ (*)     Occult Blood UA 3+ (*)     Urobilinogen, UA 2.0-3.0 (*)     Leukocytes, UA 2+ (*)     All other components within normal limits    Narrative:     Preferred Collection Type->Urine, Clean Catch   PHOSPHORUS - Abnormal; Notable for the following components:    Phosphorus 2.4 (*)     All other components within normal limits   APTT - Abnormal; Notable for the  following components:    aPTT 32.6 (*)     All other components within normal limits   PROTIME-INR - Abnormal; Notable for the following components:    Prothrombin Time 12.6 (*)     All other components within normal limits   PROCALCITONIN - Abnormal; Notable for the following components:    Procalcitonin 9.29 (*)     All other components within normal limits   ISTAT PROCEDURE - Abnormal; Notable for the following components:    POC PCO2 24.9 (*)     POC PO2 30 (*)     POC HCO3 13.9 (*)     POC SATURATED O2 57 (*)     POC TCO2 15 (*)     All other components within normal limits   INFLUENZA A & B BY MOLECULAR   LIPASE   MAGNESIUM   T4, FREE   LACTIC ACID, PLASMA     EKG Readings: (Independently Interpreted)   Sinus tachycardia at 146 bpm with PAC's, PVC's, and nonspecific ST changes that are most likely due to tachycardia.         X-Rays:   Independently Interpreted Readings:   Other Readings:  Reviewed by myself, read by radiology.     Imaging Results          CT Chest Abdoment Pelvis Without Contrast (XPD) (Final result)  Result time 11/03/19 01:00:23    Final result by Betzaida Hatch MD (11/03/19 01:00:23)                 Impression:      4.1 cm aneurysmal dilatation of the ascending thoracic aorta.  Ectasias of the abdominal aorta.  Advanced atherosclerotic disease.    15 x 7 mm left UPJ calculus with mild hydronephrosis.    Moderately large rectal and large rectal stool.  Findings suggest constipation and/or fecal impaction.      Electronically signed by: Betzaida Hatch  Date:    11/03/2019  Time:    01:00             Narrative:    EXAMINATION:  CT OF CHEST ABDOMEN PELVIS WITHOUT    CLINICAL HISTORY:  89-year-old presenting with irregular breathing and altered mental status.    TECHNIQUE:  5 mm unenhanced axial images were obtained from the lung apices through the greater trochanters.    COMPARISON:  None.    FINDINGS:  In the chest, there is no contusion or pneumothorax. There are no displaced rib  fractures. There is no pleural or pericardial effusion. The aorta is intact. The heart size is within normal limits. There is moderate bibasilar atelectasis.  There is 4.1 cm aneurysmal dilatation of the ascending thoracic aorta.  The thoracic aorta is tortuous.    In the abdomen, there is a 15 x 7 mm left UPJ calculus with resultant mild hydronephrosis.  The liver, spleen, pancreas, kidneys, and adrenal glands are unremarkable.  The gallbladder is surgically absent.  There is no pneumoperitoneum or free fluid detected.  At the level of the left renal artery, there is ectasia of the abdominal aorta measuring up to 2.6 cm.  Moderate atherosclerotic calcifications are noted.    In the pelvis, there is no free fluid.  There is moderately large colonic stool and large rectal stool in the moderately patulous rectum.  The urinary bladder is depressed come by Sheridan catheter.                               CT Head Without Contrast (Final result)  Result time 11/03/19 00:47:13    Final result by Betzaida Hatch MD (11/03/19 00:47:13)                 Impression:      No acute intracranial abnormality detected. Stable cerebral atrophy and chronic small vessel ischemic changes.    Acute right sphenoid sinusitis.    Gas in the venous sinuses.      Electronically signed by: Betzaida Hatch  Date:    11/03/2019  Time:    00:47             Narrative:    EXAMINATION:  CT OF THE HEAD WITHOUT    CLINICAL HISTORY:  Confusion/delirium, altered LOC, unexplained;    TECHNIQUE:  5 mm unenhanced axial images were obtained from the skull base to the vertex.    COMPARISON:  01/03/2017    FINDINGS:  Moderate cerebral atrophy and chronic small vessel ischemic changes are present.  There is no acute intracranial hemorrhage, territorial infarct or mass effect, or midline shift.  There is gas in the venous sinuses, which has been associated with IV lines, trauma, surgery and iatrogenic causes. In the visualized paranasal sinuses, there is a  small fluid level in the right sphenoid sinus.  There is senescent calcifications of the basal ganglia.  Bilateral scleral banding is present.                              Medical Decision Making:   History:   Old Medical Records: I decided to obtain old medical records.  Initial Assessment:   This is a 89-year-old female, history of dementia, hypertension, CKD, presents the ER for evaluation of shortness of breath and altered mental status.  Onset today, patient's daughter reported worsening functional decline, decreased oral intake.  Reported prior to arrival, patient was having difficulty breathing, which prompted her to call EMS.  EMS reported patient was noted to be tachycardic in the 140s, hypertensive, with some improvement of fluid.  On arrival, chronically ill-appearing female, appears demented, answering questions appropriately, tachycardic in the 140s, hypotensive, with a rectal temperature of a 103°.  Code sepsis was initiated, gave Rocephin for possible pneumonia versus UTI, will obtain CT imaging, will give fluid bolus and will plan admission.  Patient's family arrived bedside, discussed patient's critical illness.  Family is coming decide DNR DNI status.  Awaiting blood work and admission.  Clinical Tests:   Lab Tests: Reviewed and Ordered  Radiological Study: Reviewed and Ordered  Medical Tests: Reviewed and Ordered              Attending Attestation:           Physician Attestation for Scribe:  Physician Attestation Statement for Scribe #1: I, Josh Rushing MD, reviewed documentation, as scribed by Yue Huston in my presence, and it is both accurate and complete.                 ED Course as of Nov 03 0641   Sun Nov 03, 2019   0050 Patient resting in bed, heart rate improving.  Labs imaging reviewed.  Significant lactic acidosis at 11, elevated procalcitonin, no obvious source of infection yet.  Awaiting UA.  CT scan imaging read by me, no acute intracranial process, questionable lung  infiltrates, no obvious intra-abdominal process.  Patient given broad-spectrum antibiotics.  Also noted elevated troponin at 0.923, likely demand due to tachycardia from sepsis and severe dehydration.  A KI also noted. Patient given 2 fluid bolus within approved of blood pressure, currently hemodynamically stable.  Patient to to [facility] accepted patient.  Patient remains critically ill, family aware.    [SE]   0128 RestingIn bed, admitted to medical team.  CT revealed large left-sided kidney stone, still awaiting UA to assess if it is infected.  Family is still deciding on level of care they would like, unsure if they would like to make patient DNR DNI, or focal Code.  Awaiting UA, and UA in rest of blood results.    [SE]   0153 Discussed with [facility] team, family wishes to make patient DNR/DNI, did not wish to proceed with any aggressive measures.  Patient will be admitted to [facility] team.    [SE]      ED Course User Index  [SE] Josh Rushing MD     Clinical Impression:       ICD-10-CM ICD-9-CM   1. Septic shock A41.9 038.9    R65.21 785.52     995.92   2. Tachycardia R00.0 785.0   3. SOB (shortness of breath) R06.02 786.05   4. Complicated UTI (urinary tract infection) N39.0 599.0   5. Kidney stone on left side N20.0 592.0         Disposition:   Disposition: Admitted  Condition: Critical                        Josh Rushing MD  11/03/19 0641

## 2019-11-04 PROBLEM — Z71.89 COUNSELING REGARDING ADVANCE CARE PLANNING AND GOALS OF CARE: Status: ACTIVE | Noted: 2019-11-04

## 2019-11-04 PROBLEM — L89.610 PRESSURE INJURY OF RIGHT HEEL, UNSTAGEABLE: Status: ACTIVE | Noted: 2019-11-04

## 2019-11-04 PROBLEM — Z51.5 PALLIATIVE CARE ENCOUNTER: Status: ACTIVE | Noted: 2019-11-04

## 2019-11-04 PROBLEM — L89.153 PRESSURE INJURY OF COCCYGEAL REGION, STAGE 3: Status: ACTIVE | Noted: 2019-11-04

## 2019-11-04 LAB
ALBUMIN SERPL BCP-MCNC: 1.5 G/DL (ref 3.5–5.2)
ALP SERPL-CCNC: 114 U/L (ref 55–135)
ALT SERPL W/O P-5'-P-CCNC: 12 U/L (ref 10–44)
ANION GAP SERPL CALC-SCNC: 8 MMOL/L (ref 8–16)
AORTIC ROOT ANNULUS: 1.92 CM
AORTIC VALVE CUSP SEPERATION: 0.48 CM
AST SERPL-CCNC: 35 U/L (ref 10–40)
AV INDEX (PROSTH): 0.36
AV MEAN GRADIENT: 15 MMHG
AV PEAK GRADIENT: 23 MMHG
AV VALVE AREA: 0.99 CM2
AV VELOCITY RATIO: 0.36
BASOPHILS # BLD AUTO: 0.02 K/UL (ref 0–0.2)
BASOPHILS NFR BLD: 0 % (ref 0–1.9)
BILIRUB SERPL-MCNC: 0.7 MG/DL (ref 0.1–1)
BSA FOR ECHO PROCEDURE: 1.23 M2
BUN SERPL-MCNC: 28 MG/DL (ref 10–30)
CALCIUM SERPL-MCNC: 7.4 MG/DL (ref 8.7–10.5)
CHLORIDE SERPL-SCNC: 113 MMOL/L (ref 95–110)
CO2 SERPL-SCNC: 18 MMOL/L (ref 23–29)
CREAT SERPL-MCNC: 1.4 MG/DL (ref 0.5–1.4)
CV ECHO LV RWT: 0.66 CM
DIFFERENTIAL METHOD: ABNORMAL
DOP CALC AO PEAK VEL: 2.41 M/S
DOP CALC AO VTI: 49.99 CM
DOP CALC LVOT AREA: 2.8 CM2
DOP CALC LVOT DIAMETER: 1.88 CM
DOP CALC LVOT PEAK VEL: 0.87 M/S
DOP CALC LVOT STROKE VOLUME: 49.39 CM3
DOP CALCLVOT PEAK VEL VTI: 17.8 CM
E WAVE DECELERATION TIME: 126.92 MSEC
E/A RATIO: 0.83
ECHO LV POSTERIOR WALL: 1.11 CM (ref 0.6–1.1)
EOSINOPHIL # BLD AUTO: 0 K/UL (ref 0–0.5)
EOSINOPHIL NFR BLD: 0 % (ref 0–8)
ERYTHROCYTE [DISTWIDTH] IN BLOOD BY AUTOMATED COUNT: 13.9 % (ref 11.5–14.5)
EST. GFR  (AFRICAN AMERICAN): 38 ML/MIN/1.73 M^2
EST. GFR  (NON AFRICAN AMERICAN): 33 ML/MIN/1.73 M^2
FERRITIN SERPL-MCNC: 565 NG/ML (ref 20–300)
FOLATE SERPL-MCNC: 3.2 NG/ML (ref 4–24)
FRACTIONAL SHORTENING: 26 % (ref 28–44)
GLUCOSE SERPL-MCNC: 88 MG/DL (ref 70–110)
HCT VFR BLD AUTO: 26.8 % (ref 37–48.5)
HGB BLD-MCNC: 8.8 G/DL (ref 12–16)
INTERVENTRICULAR SEPTUM: 1.12 CM (ref 0.6–1.1)
IRON SERPL-MCNC: <10 UG/DL (ref 30–160)
IVRT: 0.07 MSEC
LA MAJOR: 4.51 CM
LA MINOR: 4.37 CM
LA WIDTH: 2.81 CM
LACTATE SERPL-SCNC: 1.5 MMOL/L (ref 0.5–2.2)
LEFT ATRIUM SIZE: 2.34 CM
LEFT ATRIUM VOLUME INDEX: 18.1 ML/M2
LEFT ATRIUM VOLUME: 24.81 CM3
LEFT INTERNAL DIMENSION IN SYSTOLE: 2.49 CM (ref 2.1–4)
LEFT VENTRICLE DIASTOLIC VOLUME INDEX: 33.41 ML/M2
LEFT VENTRICLE DIASTOLIC VOLUME: 45.86 ML
LEFT VENTRICLE MASS INDEX: 83 G/M2
LEFT VENTRICLE SYSTOLIC VOLUME INDEX: 16.1 ML/M2
LEFT VENTRICLE SYSTOLIC VOLUME: 22.05 ML
LEFT VENTRICULAR INTERNAL DIMENSION IN DIASTOLE: 3.35 CM (ref 3.5–6)
LEFT VENTRICULAR MASS: 113.87 G
LYMPHOCYTES # BLD AUTO: 1.5 K/UL (ref 1–4.8)
LYMPHOCYTES NFR BLD: 3.7 % (ref 18–48)
MAGNESIUM SERPL-MCNC: 1.2 MG/DL (ref 1.6–2.6)
MCH RBC QN AUTO: 28.3 PG (ref 27–31)
MCHC RBC AUTO-ENTMCNC: 32.8 G/DL (ref 32–36)
MCV RBC AUTO: 86 FL (ref 82–98)
MONOCYTES # BLD AUTO: 2.2 K/UL (ref 0.3–1)
MONOCYTES NFR BLD: 5.4 % (ref 4–15)
MV PEAK A VEL: 1.12 M/S
MV PEAK E VEL: 0.93 M/S
NEUTROPHILS # BLD AUTO: 36.8 K/UL (ref 1.8–7.7)
NEUTROPHILS NFR BLD: 90.9 % (ref 38–73)
PHOSPHATE SERPL-MCNC: 2.4 MG/DL (ref 2.7–4.5)
PISA TR MAX VEL: 1.94 M/S
PLATELET # BLD AUTO: 194 K/UL (ref 150–350)
PMV BLD AUTO: 10.1 FL (ref 9.2–12.9)
POCT GLUCOSE: 78 MG/DL (ref 70–110)
POCT GLUCOSE: 81 MG/DL (ref 70–110)
POCT GLUCOSE: 86 MG/DL (ref 70–110)
POCT GLUCOSE: 86 MG/DL (ref 70–110)
POTASSIUM SERPL-SCNC: 4.1 MMOL/L (ref 3.5–5.1)
PROT SERPL-MCNC: 4.9 G/DL (ref 6–8.4)
PULM VEIN S/D RATIO: 0.79
PV PEAK D VEL: 0.43 M/S
PV PEAK S VEL: 0.34 M/S
RA MAJOR: 3.97 CM
RA WIDTH: 1.22 CM
RBC # BLD AUTO: 3.11 M/UL (ref 4–5.4)
SATURATED IRON: ABNORMAL % (ref 20–50)
SODIUM SERPL-SCNC: 139 MMOL/L (ref 136–145)
TOTAL IRON BINDING CAPACITY: 114 UG/DL (ref 250–450)
TR MAX PG: 15 MMHG
TRANSFERRIN SERPL-MCNC: 77 MG/DL (ref 200–375)
VANCOMYCIN SERPL-MCNC: 9.6 UG/ML
VIT B12 SERPL-MCNC: 448 PG/ML (ref 210–950)
WBC # BLD AUTO: 41.48 K/UL (ref 3.9–12.7)

## 2019-11-04 PROCEDURE — 80202 ASSAY OF VANCOMYCIN: CPT

## 2019-11-04 PROCEDURE — 92610 EVALUATE SWALLOWING FUNCTION: CPT

## 2019-11-04 PROCEDURE — 97535 SELF CARE MNGMENT TRAINING: CPT

## 2019-11-04 PROCEDURE — 63600175 PHARM REV CODE 636 W HCPCS: Performed by: STUDENT IN AN ORGANIZED HEALTH CARE EDUCATION/TRAINING PROGRAM

## 2019-11-04 PROCEDURE — 83540 ASSAY OF IRON: CPT

## 2019-11-04 PROCEDURE — 85025 COMPLETE CBC W/AUTO DIFF WBC: CPT

## 2019-11-04 PROCEDURE — 87040 BLOOD CULTURE FOR BACTERIA: CPT

## 2019-11-04 PROCEDURE — 27000221 HC OXYGEN, UP TO 24 HOURS

## 2019-11-04 PROCEDURE — 83605 ASSAY OF LACTIC ACID: CPT

## 2019-11-04 PROCEDURE — 94770 HC EXHALED C02 TEST: CPT

## 2019-11-04 PROCEDURE — 83735 ASSAY OF MAGNESIUM: CPT

## 2019-11-04 PROCEDURE — 94761 N-INVAS EAR/PLS OXIMETRY MLT: CPT

## 2019-11-04 PROCEDURE — 82746 ASSAY OF FOLIC ACID SERUM: CPT

## 2019-11-04 PROCEDURE — 25000003 PHARM REV CODE 250: Performed by: STUDENT IN AN ORGANIZED HEALTH CARE EDUCATION/TRAINING PROGRAM

## 2019-11-04 PROCEDURE — 82607 VITAMIN B-12: CPT

## 2019-11-04 PROCEDURE — 36415 COLL VENOUS BLD VENIPUNCTURE: CPT

## 2019-11-04 PROCEDURE — 63600175 PHARM REV CODE 636 W HCPCS: Performed by: INTERNAL MEDICINE

## 2019-11-04 PROCEDURE — 84100 ASSAY OF PHOSPHORUS: CPT

## 2019-11-04 PROCEDURE — 99900035 HC TECH TIME PER 15 MIN (STAT)

## 2019-11-04 PROCEDURE — 82728 ASSAY OF FERRITIN: CPT

## 2019-11-04 PROCEDURE — 80053 COMPREHEN METABOLIC PANEL: CPT

## 2019-11-04 PROCEDURE — 21400001 HC TELEMETRY ROOM

## 2019-11-04 PROCEDURE — S0030 INJECTION, METRONIDAZOLE: HCPCS | Performed by: STUDENT IN AN ORGANIZED HEALTH CARE EDUCATION/TRAINING PROGRAM

## 2019-11-04 RX ORDER — MAGNESIUM SULFATE HEPTAHYDRATE 40 MG/ML
2 INJECTION, SOLUTION INTRAVENOUS ONCE
Status: COMPLETED | OUTPATIENT
Start: 2019-11-04 | End: 2019-11-04

## 2019-11-04 RX ADMIN — ACETAMINOPHEN 650 MG: 325 TABLET ORAL at 12:11

## 2019-11-04 RX ADMIN — MAGNESIUM SULFATE IN WATER 2 G: 40 INJECTION, SOLUTION INTRAVENOUS at 12:11

## 2019-11-04 RX ADMIN — SODIUM CHLORIDE, SODIUM LACTATE, POTASSIUM CHLORIDE, AND CALCIUM CHLORIDE: .6; .31; .03; .02 INJECTION, SOLUTION INTRAVENOUS at 10:11

## 2019-11-04 RX ADMIN — HEPARIN SODIUM 5000 UNITS: 5000 INJECTION, SOLUTION INTRAVENOUS; SUBCUTANEOUS at 04:11

## 2019-11-04 RX ADMIN — SODIUM CHLORIDE, SODIUM LACTATE, POTASSIUM CHLORIDE, AND CALCIUM CHLORIDE: .6; .31; .03; .02 INJECTION, SOLUTION INTRAVENOUS at 06:11

## 2019-11-04 RX ADMIN — METRONIDAZOLE 500 MG: 500 INJECTION, SOLUTION INTRAVENOUS at 12:11

## 2019-11-04 RX ADMIN — METRONIDAZOLE 500 MG: 500 INJECTION, SOLUTION INTRAVENOUS at 09:11

## 2019-11-04 RX ADMIN — POTASSIUM PHOSPHATE, MONOBASIC AND POTASSIUM PHOSPHATE, DIBASIC 15 MMOL: 224; 236 INJECTION, SOLUTION, CONCENTRATE INTRAVENOUS at 04:11

## 2019-11-04 RX ADMIN — HEPARIN SODIUM 5000 UNITS: 5000 INJECTION, SOLUTION INTRAVENOUS; SUBCUTANEOUS at 09:11

## 2019-11-04 RX ADMIN — CEFEPIME 2 G: 2 INJECTION, POWDER, FOR SOLUTION INTRAVENOUS at 08:11

## 2019-11-04 RX ADMIN — HEPARIN SODIUM 5000 UNITS: 5000 INJECTION, SOLUTION INTRAVENOUS; SUBCUTANEOUS at 06:11

## 2019-11-04 NOTE — PROGRESS NOTES
Therapy with vancomycin complete and/or consult discontinued by provider.  Pharmacy will sign off, please re-consult as needed.    Gaby Redmond, MarkusD, BCPS

## 2019-11-04 NOTE — CONSULTS
Wound care consult for multiple wounds.    Pt with multiple wounds present on admission. Notified Dr. VIELKA Perry via telephone of assessment, wound care orders given, santyl ordered from pharmacy per her request.   Recommend podiatry consult for bilateral heels and left lateral ankle.    Pt with the following wounds, present on admission, confirmed POA by daughter.    1)  Coccyx, Stage 3, julio cesar wound DTI. Wound bed red with 20% slough, small serosanguinously drainage on bandage, no odor.  2)  Left hip, Stage 1 -  3)  Right hip, DTI  4)  Left lateral ankle, Unstageable  5)  Left lateral heel, Unstageable  6)  Left Post Heel, Stage 1  7)  Left media ankle, DTI.   8)  Right lateral heel, DTI  9)  Right lateral foot, DTI  10)  Right lateral ankle, DTI  11)  Right top  Foot, DTI  12)  Right heel, Unstageable, Stage 4. Wound bed with slough and eschar.

## 2019-11-04 NOTE — PLAN OF CARE
Pt only responding to verbal and painful stimuli. 1L supplemental oxygen provide. Pt NPO, iv fluids and antibiotics infusing per mar. Sheridan in place, output recorded in mar. Cardiac monitored. Bedrest maintained per order. Glucose monitored, Dextrose 10% provided. Pt hypotensive throughout shift. Safety maintained. Will continue to monitor.

## 2019-11-04 NOTE — PLAN OF CARE
Patient on oxygen with documented flow.  Will attempt to wean per O2 order protocol.  Will continue to monitor.

## 2019-11-04 NOTE — NURSING
Pt. Resting quietly, easily aroused to light touch, NAD noted.  Pt's daughter at bedside.  Safety measures remain in progress, bed in lowest position, siderails up x2, wheels locked x4, bed alarm set and audible.  Will continue to monitor pt. Throughout shift.

## 2019-11-04 NOTE — PLAN OF CARE
TN met with patient's daughter Adrianna, [phone] at bedside as patient was off floor for testing. Per Adrianna, patient lives at home with her and is completley bed bound. Adrianna, her brothers and uncle are patient's help at home.  DME at home includes a hospital bed, wheelchair and shower chair ( which daughter states patient is no longer using). No other DME or HH noted. Upon discharge patient with have transportation home and to follow up appointment provided by family.     TN updated whiteboard with contact information. Blue discharge folder and discharge brochure given to patient's daughter ,Adrianna.  TN instructed Adrianna to contact TN if she has any further questions or concerns. Tn will continue to follow.       11/04/19 1213   Discharge Assessment   Assessment Type Discharge Planning Assessment   Confirmed/corrected address and phone number on facesheet? Yes   Assessment information obtained from? Caregiver;Medical Record   Expected Length of Stay (days) 3   Prior to hospitilization cognitive status: Not Oriented to Person;Not Oriented to Place;Not Oriented to Time  (Patient has dementia )   Prior to hospitalization functional status: Completely Dependent   Current cognitive status: Not Oriented to Person;Not Oriented to Place;Not Oriented to Time   Current Functional Status: Completely Dependent   Lives With child(lisa), adult  (Daughter, Adrianna [phone])   Able to Return to Prior Arrangements yes   Is patient able to care for self after discharge? No  (Patient lives with daughter Adrianna)   Patient's perception of discharge disposition home or selfcare   Readmission Within the Last 30 Days no previous admission in last 30 days   Patient currently being followed by outpatient case management? No   Patient currently receives any other outside agency services? No   Equipment Currently Used at Home hospital bed;wheelchair;shower chair   Do you have any problems affording any of your prescribed medications? No   Is  the patient taking medications as prescribed? yes   Does the patient have transportation home? Yes   Transportation Anticipated family or friend will provide   Does the patient receive services at the [facility]? No   Discharge Plan A Home with family   DME Needed Upon Discharge  none   Patient/Family in Agreement with Plan yes

## 2019-11-04 NOTE — PT/OT/SLP EVAL
Speech Language Pathology Evaluation  Bedside Swallow    Patient Name:  Moira Salter   MRN:  [id number]  Admitting Diagnosis: Septic shock    Recommendations:                 General Recommendations:  Diet f/u x1  Diet recommendations:  Puree, Pleasure Feeding, Thin   Aspiration Precautions:   1. Pt must be FULLY awake/alert for all intake  2. HOB at 90* for intake and remain upright at least 30 mins s/p meals  3. Small bites/sips  4. Slow rate of intake  5. Cue pt to swallow  6. 1:1 assist with all food/drink  7. Crush meds and deliver in puree  8. Syringe/teaspoon needed with liquids  9. Monitor for any overt s/s of aspiration (coughing/choking, throat clearing, wet/gurgly voice, spike in fever, reduced O2 sats, nasal emission, watery eyes, etc.)  General Precautions: Standard, aspiration, fall, respiratory, pureed diet  Communication strategies:  Simple, basic communication only    History:     History of Present Illness:  Moira Salter is a 89 y.o. with severe dementia (bed bound, without any idea who family members, mumbles insensibly most of the time), CKD and GERD who presents from home with increased lethargy and poor oral intake for a day     The patient was in their usual state of health--bed bound with very poor quality of life--until yesterday when her family noticed that she wasn't really eating as much as usual and seemed more sleepy than usual. She was noted to have an irregular breathing pattern as well but no recent cough or shortness of breath was noted. As far as family was able to report, she had been herself until yesterday. EMS was called and she was found to be tachycardic hypotensive and she was emergently brought to the ED. She is unable to provide any meaningful history.      Past Medical History:   Diagnosis Date    Arthritis     Dementia     Hypertension     Osteoporosis        Past Surgical History:   Procedure Laterality Date    ABDOMINAL SURGERY         Social History: Patient lives  "with daughterAdrianna.    Prior Intubation HX:  None this admit.    Modified Barium Swallow: None per EMR.    Chest X-Rays: None completed this admit.    Prior diet: Puree textures, vegetables, soft/minced chicken, bottle feeds, and "baby" foods.    Subjective     Pt seen at the bedside for clinical swallow assessment. ST checked w/ RN, Juliette, prior to visit. Juliette present in room for portion of eval to assist with administering meds. Pt very lethargic with minimal eye opening. Mumblings/sing-song voice noted throughout. Pt's daughter, Adrianna, and brother remained in room for evaluation.   Patient goals: None stated. Family goals: for pt to eat      Pain/Comfort:  · Pain Rating 1: 0/10    Objective:     Oral Musculature Evaluation  · Oral Musculature: general weakness  · Dentition: edentulous  · Secretion Management: adequate  · Mucosal Quality: adequate  · Oral Labial Strength and Mobility: impaired seal, impaired coordination  · Lingual Strength and Mobility: impaired strength  · Volitional Cough: (Did not elicit)  · Volitional Swallow: (Did not elicit)  · Voice Prior to PO Intake: (No voicing)  · Oral Musculature Comments: (Severity of Dementia did limit oral mech exam)    Bedside Swallow Eval:   Consistencies Assessed:  · Thin liquids : milk via droplets from straw x2, 2-3 ml syringe sips x4  · Puree : tsp bites meds crushed in pudding x5     Oral Phase:   · munching pattern   · Oral holding  · Delayed A-P transfer  · Oral residue and labial residue  · Poor sucking  · Poor labial seal with anterior spillage of thin liquids  · Reduced oral awareness    Pharyngeal Phase:   · Delayed swallow initiation (mod-severe)  · Adequate hyolaryngeal elevation/excursion  · No coughing/choking s/p swallow    Compensatory Strategies  · Dry spoon to tongue to trigger additional swallow   · Cuing pt to swallow  · Laryngeal massage    Treatment: Education provided to pt, daughter, and brother re: role of SLP, rationale of BSS, " affect of Dementia on swallow function, swallow precautions, and aspiration risks. Family reported feeding pt at home while pt sleeping as pt occasionally refuses food during waking hours. Family instructed to avoid feeding while pt asleep/significantly fatigued as aspiration risk increases. Family educated re: optimal positioning for meals and need to remain upright at least 30 mins s/p intake. Daughter verbalized complete understanding of taught material though reinforcement needed.     Pt will benefit from SLP tx 2x a week while inpatient to ensure diet tolerance and modify diet as needed.       Assessment:     Moira Salter is a 89 y.o. female with an SLP diagnosis of Dysphagia complicated by underlying Dementia.  She presents with significantly delayed swallow initiation though functional tolerance of puree/thin liquids this date without any overt s/s of aspiration. Pt's diet REC: Puree/Thin liquids for pleasure/comfort given pt fully awake/alert and agreeable to intake. Meds to be given crushed in puree. Diet recs reviewed with pt, family, and MD team. SLP to f/u to ensure tolerance.     Goals:   Multidisciplinary Problems     SLP Goals        Problem: SLP Goal    Goal Priority Disciplines Outcome   SLP Goal     SLP Ongoing, Progressing   Description:  Short Term Goals:  1. Pt will participate in clinical swallow assessment to determine LRD.-met 11/4  2. Pt will consume Puree/Thin liquids for pleasure without any overt s/s of aspiration.                     Plan:     · Patient to be seen:  2 x/week   · Plan of Care expires:  12/03/19  · Plan of Care reviewed with:  patient, daughter, sibling(JERI Segovia and MD team)   · SLP Follow-Up:  Yes       Discharge recommendations:  (TBD)   Barriers to Discharge:  None    Time Tracking:     SLP Treatment Date:   11/04/19  Speech Start Time:  1228  Speech Stop Time:  1249     Speech Total Time (min):  21 min    Billable Minutes: Eval Swallow and Oral Function 10 and Seld  Care/Home Management Training 11    Jaja Truong CCC-SLP  11/04/2019

## 2019-11-04 NOTE — PROGRESS NOTES
[facility] Resident  Progress Note    Subjective:      Per family, appears comfortable.      Objective:   Last 24 Hour Vital Signs:  BP  Min: 74/53  Max: 107/55  Temp  Av.6 °F (36.4 °C)  Min: 96.4 °F (35.8 °C)  Max: 98.3 °F (36.8 °C)  Pulse  Av.8  Min: 84  Max: 110  Resp  Av.3  Min: 16  Max: 18  SpO2  Av.1 %  Min: 97 %  Max: 100 %  Weight  Av.5 kg (109 lb 2 oz)  Min: 49.5 kg (109 lb 2 oz)  Max: 49.5 kg (109 lb 2 oz)  I/O last 3 completed shifts:  In: 1403.8 [I.V.:678.8; IV Piggyback:725]  Out: 580 [Urine:580]    Physical Examination:  Gen: resting comfortably  HEENT: EOMi, oropharynx clear, dry and without exudate, JVP 6cm, no cervical lymphadenopathy   CVS: tachycardic, regular rhythm, normal S1/S2, without S3/S4 or murmurs  Resp: no use of accessory muscles, no wheezes, no rhonchi, no rales  Abdo: soft, non-tender, non-distended, bowel sounds +, without guarding or rebound; flank tenderness on L  Neuro: moves all extremities, no abnormal tone  Ext: no edema, no cyanosis  Skin: no rashes , sacral decubs noted (stage 2-3)       Laboratory:  Laboratory Data Reviewed: yes  Pertinent Findings:  WBC 41  Hb 8.8  Cr 1.4  Mg 1.2  Lactate 1.5    Microbiology Data Reviewed: yes  Pertinent Findings:  Blood culture GNR  Urine culture pending    Other Results:  EKG (my interpretation): None new    Radiology Data Reviewed: yes  Pertinent Findings:  None new    Current Medications:     Infusions:   lactated ringers 125 mL/hr at 19 0624        Scheduled:   ceFEPime (MAXIPIME) IVPB  2 g Intravenous Q24H    heparin (porcine)  5,000 Units Subcutaneous Q8H    metronidazole  500 mg Intravenous Q8H    polyethylene glycol  17 g Oral Daily        PRN:  acetaminophen, Dextrose 10% Bolus, Dextrose 10% Bolus, glucagon (human recombinant), glucose, glucose, influenza    Antibiotics and Day Number of Therapy:  vanc (11/3-)  Cefepime(11/3-)  Flagyl (11/3- )    Lines and Day Number of Therapy:  PIV    Assessment:      Moira Salter is a 89 y.o.female with  Patient Active Problem List    Diagnosis Date Noted    Septic shock 11/03/2019    Nephrolithiasis 11/03/2019    Acute unilateral obstructive uropathy 11/03/2019    Complicated UTI (urinary tract infection) 11/03/2019    ANA (acute kidney injury) 11/03/2019    High anion gap metabolic acidosis 11/03/2019    Hyperbilirubinemia 11/03/2019    Elevated troponin 11/03/2019    Subclinical hypothyroidism 11/03/2019    Thoracic aortic aneurysm 11/03/2019    Severe sepsis     Kidney stone on left side     Tachycardia     Syncope 01/03/2017    Dementia 07/24/2012    GERD (gastroesophageal reflux disease) 07/24/2012    Hypertension 07/24/2012    Depression 07/24/2012        Plan:     Septic Shock 2/2 acute complicated UTI with unilateral obstructive uropathy with gram negative bacteremia  - 89F (bedbound, severe dementia) presents with one day of poor PO intake and lethargy, noticed to be cold and have abnormal respiratory pattern by family  - febrile to 103 in ED, with tachycardia and hypotension, without leukocytosis but lactic of 11 and procal 9.29 and evidence of end organ damage (elevated trop and ANA); UA suggestive of infection and fede pus noted in reyes   - CT chest/abdo/pelvis with large (15 x 7 mm) renal calculus + mild hydronephrosis on L  - has sacral decub (stage 2-3) though less likely source  - s/p sepsis bolus, vanc, rocephin and flagyl in ED with improvement in BP  - plan for continued vanc and flagyl with exchange of rocephin for cefepime and non-emergent urology consult for source control in AM (were given option for transfer for emergent urologic procedure)  - blood cultures pending  - after discussion with family members and discussions among themselves, will change code status to DNR, will not admit patient to the ICU as they do not want invasive lines or vasopressors, if these were required  - WBC elevated this morning but lactate resolved and  BP okay, will continue current plan for now  - blood cultures with gram negative rods, repeating, may stop vanc today     Elevated troponin  - presents 0.923 from normal baseline, suspect demand in setting of septic shock  - no ischemic EKG changes; will not trend as no potential   intervention     ANA on CKD  - CR 1.6 from baseline 1.0  - baseline around 50, presents at 28   - suspect more related to volume depletion/sepsis than unilateral obstruction and expect improvement in morning labs after resucitation   - improving with fluids     AGMA, resolved  - 2/2 lactic acidosis, will trend lactate, suspect improvement after fluid resuscitation     Hyperbilirubinemia  - 1.7 from normal baseline  - unclear etiology though ALP elevated from normal along with mild bump in AST  - doubt biliary obstruction, CT documents normal pancreas, liver and gallbladder absent  - if worsening, can consider US to check CBD    Stage II-III decubitus ulcer  - wound care consult     Aortic aneurysm, thoracic  - noted on CT     Protein gap  - noted on labs, defer work up     Dementia, severe  - completely bed bound and without any concept of who her family members are anymore  - family aware of her poor quality of life and all were in agreement that a less aggressive strategy in optimal course     Diet: NPO  Ppx: heparin  Dispo:  floor with antibiotics         Code Status:      DNR      Cyril Owens  [facility] HO-III  [facility] Medicine Service Team A    [facility] Medicine Hospitalist Pager numbers:   [facility] Hospitalist Medicine Team A (Pop/Codey): [phone]  [facility] Hospitalist Medicine Team B (Caryn/Mae):  [phone]

## 2019-11-04 NOTE — PLAN OF CARE
Reviewed plan of care with pt's family, understanding verbalized.  Safety measures in progress, bed in lowest position, wheels locked x4, siderails up x2, bed alarm set and audible, call light within reach.  Will monitor pt. Throughout shift.

## 2019-11-04 NOTE — MEDICAL/APP STUDENT
[facility] Medicine Progress Note    Primary Team: [facility] Hospitalist Team A  Attending Physician: Suman Lord MD  Resident: Roman  Intern: Venkat    Subjective:      Ms. Salter is a 89 y.o. Female with a PMHx of severe dementia, CKD, and GERD who presented from home on 11/3 with increased lethargy and poor oral intake for a day.     History from the patient is limited due to state of dementia. Patient usual state of health is bed-bound and non-communicable. Patient is accompanied by her daughter in the room who states that there were no acute events over night. Patient is resting and in NAD.      Objective:     Last 24 Hour Vital Signs:  BP  Min: 74/53  Max: 107/55  Temp  Av.6 °F (36.4 °C)  Min: 96.4 °F (35.8 °C)  Max: 98.3 °F (36.8 °C)  Pulse  Av.7  Min: 84  Max: 110  Resp  Av.3  Min: 16  Max: 18  SpO2  Av.1 %  Min: 97 %  Max: 100 %  Weight  Av.5 kg (109 lb 2 oz)  Min: 49.5 kg (109 lb 2 oz)  Max: 49.5 kg (109 lb 2 oz)  I/O last 3 completed shifts:  In: 1403.8 [I.V.:678.8; IV Piggyback:725]  Out: 580 [Urine:580]    Physical Examination:  Gen: awake, does not follow commands, mumbles insensibly   HEENT: PERRL, EOMi, oropharynx clear, dry and without exudate, JVP 6cm, no cervical lymphadenopathy   CVS: tachycardic, regular rhythm, normal S1/S2, without S3/S4 or murmurs  Resp: no use of accessory muscles, no wheezes, no rhonchi, no rales  Abdo: soft, non-tender, non-distended, bowel sounds +, without guarding or rebound; flank tenderness on L  Neuro: moves all extremities, no abnormal tone  Ext: no edema, no cyanosis  Skin: no rashes , sacral decubs noted (stage 2-3), ulcer of the left foot.     Laboratory:  Recent Labs   Lab 19  2324 19  0836 19  0558   WBC 11.58  --  41.48*   HGB 11.8*  --  8.8*   HCT 36.4*  --  26.8*     --  194    139 139   K 3.2* 3.3* 4.1    112* 113*   CREATININE 1.6* 1.3 1.4   BUN 22 22 28   CO2 14* 12* 18*   ALT 13 12 12   AST 44*  50* 35       Microbiology Data Reviewed: yes  Pertinent Findings:  Blood culture revealed anaerobic and aerobic gram negative rods.   Urine culture: e coli 10,000-49,999      Current Medications:     Infusions:   lactated ringers 125 mL/hr at 11/04/19 0624        Scheduled:   ceFEPime (MAXIPIME) IVPB  2 g Intravenous Q24H    heparin (porcine)  5,000 Units Subcutaneous Q8H    magnesium sulfate IVPB  2 g Intravenous Once    metronidazole  500 mg Intravenous Q8H    polyethylene glycol  17 g Oral Daily        PRN:  acetaminophen, Dextrose 10% Bolus, Dextrose 10% Bolus, glucagon (human recombinant), glucose, glucose, influenza    Antibiotics and Day Number of Therapy:  Cefepime 2 g IV daily initiated on 11/3   Metronidazole 500 mg IV q 8 hours initiated on 11/3  Vancomycin 750 mg 20 mg/kg given once in the ED on 11/3     Assessment:     Moira Salter is a 89 y.o.female with  Patient Active Problem List    Diagnosis Date Noted    Septic shock 11/03/2019    Nephrolithiasis 11/03/2019    Acute unilateral obstructive uropathy 11/03/2019    Complicated UTI (urinary tract infection) 11/03/2019    ANA (acute kidney injury) 11/03/2019    High anion gap metabolic acidosis 11/03/2019    Hyperbilirubinemia 11/03/2019    Elevated troponin 11/03/2019    Subclinical hypothyroidism 11/03/2019    Thoracic aortic aneurysm 11/03/2019    Severe sepsis     Kidney stone on left side     Tachycardia     Syncope 01/03/2017    Dementia 07/24/2012    GERD (gastroesophageal reflux disease) 07/24/2012    Hypertension 07/24/2012    Depression 07/24/2012        Plan:   Septic Shock 2/2 acute complicated UTI with unilateral obstructive uropathy  - 89F (bedbound, severe dementia) presented on 11/3/19 with one day of poor PO intake and lethargy, noticed to be cold and have abnormal respiratory pattern by family  - febrile to 103 in ED, with tachycardia and hypotension, without leukocytosis but lactic of 11 and procal 9.29 and  evidence of end organ damage (elevated trop and ANA); UA suggestive of infection and fede pus noted in reyes   - CT chest/abdo/pelvis with large (15 x 7 mm) renal calculus + mild hydronephrosis on L  - has sacral decub (stage 2-3) though less likely source  - s/p sepsis bolus, vanc, rocephin and flagyl in ED with improvement in BP  - plan for continued vanc and flagyl with exchange of rocephin for cefepime and non-emergent urology consult for source control in AM (were given option for transfer for emergent urologic procedure)  - blood cultures revealed anaerobic and aerobic gram negative rods   - urine culture: prelim result revealed E. Coli   - after discussion with family members and discussions among themselves, will change code status to DNR, will not admit patient to the ICU as they do not want invasive lines or vasopressors, if these were required  - leukocytosis 11/4 am-- 41.48      Elevated troponin  - presents 0.923 from normal baseline, suspect demand in setting of septic shock  - no ischemic EKG changes     ANA on CKD  - CR 1.6 from baseline 1.0  - baseline around 50, presents at 28   - suspect more related to volume depletion/sepsis than unilateral obstruction and expect improvement in morning labs after resucitation      AGMA  - 2/2 lactic acidosis, will trend lactate, suspect improvement after fluid resuscitation      Hyperbilirubinemia  - 1.7 from normal baseline  - unclear etiology though ALP elevated from normal along with mild bump in AST  - doubt biliary obstruction, CT documents normal pancreas, liver and gallbladder absent  - if worsening, can consider US to check CBD    Stage II-III decubitus ulcer  - wound care consult     Aortic aneurysm, thoracic  - noted on CT     Protein gap  - noted on labs, defer work up     Dementia, severe  - completely bed bound and without any concept of who her family members are anymore  - family aware of her poor quality of life and all were in agreement that a  less aggressive strategy in optimal course     Diet: NPO  Ppx: heparin  Dispo: pending urology eval, floor with antibiotics     Paola Tellez [facility] PA-S2    [facility] Medicine Hospitalist Pager numbers:   [facility] Hospitalist Medicine Team A (Pop/Codey): [phone]  [facility] Hospitalist Medicine Team B (Caryn/Mae):  [phone]

## 2019-11-04 NOTE — PHYSICIAN QUERY
PT Name: Moira Salter  MR #: [id number]  Physician Query Form - CKD Clarification     CDS/: Camila Miller RN CDI     Contact information: [email or web address]  This form is a permanent document in the medical record.     Query Date: November 4, 2019    By submitting this query, we are merely seeking further clarification of documentation. Please utilize your independent clinical judgment when addressing the question(s) below.    The Medical record contains the following:     Indicators   Supporting Clinical Findings   Location in Medical Record   X CKD or Chronic Kidney (Renal) Failure / Disease ANA on CKD  - CR 1.6 from baseline 1.0  - baseline around 50, presents at 28   - suspect more related to volume depletion/sepsis than unilateral obstruction and expect improvement in morning labs after resucitation  Hospital medicine H&P   11/3 102 am   A Lori CAMPBELL / KRIS Lord MD   X BUN/Creatinine                      GFR             BUN       CR      eGFR  11/2     22         1.6        28        11/3     22         1.3        36  11/4     28         1.4        33 Labs    X Dehydration severe dehydration. ER MD Notes  11/2 1106 pm  KALLIE Rushing MD /     Nausea / Vomiting      Dialysis / CRRT      Medication     X Treatment improving with fluids Hospital medicine 11/4 439 am  A Lori CAMPBELL / Suman Lord MD    X Other Chronic Conditions Sepsis secondary to UTI Hospital medicine H&P 110/03 102 am A Lori CAMPBELL / KRIS Lord MD    Other       Provider, please further specify the stage of CKD.    [   ] Chronic Kidney Disease (CKD) (please specify stage* below)      [employer] Definitions     Stage Description eGFR (mL/min)   [  X ]    III Moderately reduced kidney function 30-59   [   ]    IV Severely reduced kidney function 15-29         [   ] Other (please specify): ____________    [   ]  Clinically Undetermined          Please document in your progress notes daily for the duration of treatment until resolved and  include in your discharge summary.

## 2019-11-04 NOTE — PLAN OF CARE
Safety measures in progress, bed in lowest position, wheels locked x4, siderails up x2. Bed alarm set and audible, call light within reach.  Monitoring will continue throughout the shift.

## 2019-11-05 VITALS
DIASTOLIC BLOOD PRESSURE: 64 MMHG | TEMPERATURE: 98 F | OXYGEN SATURATION: 95 % | HEART RATE: 74 BPM | BODY MASS INDEX: 24.55 KG/M2 | HEIGHT: 56 IN | WEIGHT: 109.13 LBS | SYSTOLIC BLOOD PRESSURE: 111 MMHG | RESPIRATION RATE: 15 BRPM

## 2019-11-05 LAB
ALBUMIN SERPL BCP-MCNC: 1.5 G/DL (ref 3.5–5.2)
ALP SERPL-CCNC: 301 U/L (ref 55–135)
ALT SERPL W/O P-5'-P-CCNC: 11 U/L (ref 10–44)
ANION GAP SERPL CALC-SCNC: 9 MMOL/L (ref 8–16)
AST SERPL-CCNC: 26 U/L (ref 10–40)
BACTERIA UR CULT: ABNORMAL
BASOPHILS # BLD AUTO: ABNORMAL K/UL (ref 0–0.2)
BASOPHILS NFR BLD: 0 % (ref 0–1.9)
BILIRUB SERPL-MCNC: 0.9 MG/DL (ref 0.1–1)
BUN SERPL-MCNC: 27 MG/DL (ref 10–30)
CALCIUM SERPL-MCNC: 7.7 MG/DL (ref 8.7–10.5)
CHLORIDE SERPL-SCNC: 113 MMOL/L (ref 95–110)
CO2 SERPL-SCNC: 16 MMOL/L (ref 23–29)
CREAT SERPL-MCNC: 0.8 MG/DL (ref 0.5–1.4)
DIFFERENTIAL METHOD: ABNORMAL
EOSINOPHIL # BLD AUTO: ABNORMAL K/UL (ref 0–0.5)
EOSINOPHIL NFR BLD: 0 % (ref 0–8)
ERYTHROCYTE [DISTWIDTH] IN BLOOD BY AUTOMATED COUNT: 14.1 % (ref 11.5–14.5)
EST. GFR  (AFRICAN AMERICAN): >60 ML/MIN/1.73 M^2
EST. GFR  (NON AFRICAN AMERICAN): >60 ML/MIN/1.73 M^2
GLUCOSE SERPL-MCNC: 73 MG/DL (ref 70–110)
HCT VFR BLD AUTO: 28.5 % (ref 37–48.5)
HGB BLD-MCNC: 9.4 G/DL (ref 12–16)
LYMPHOCYTES # BLD AUTO: ABNORMAL K/UL (ref 1–4.8)
LYMPHOCYTES NFR BLD: 4 % (ref 18–48)
MAGNESIUM SERPL-MCNC: 1.7 MG/DL (ref 1.6–2.6)
MCH RBC QN AUTO: 28.4 PG (ref 27–31)
MCHC RBC AUTO-ENTMCNC: 33 G/DL (ref 32–36)
MCV RBC AUTO: 86 FL (ref 82–98)
MONOCYTES # BLD AUTO: ABNORMAL K/UL (ref 0.3–1)
MONOCYTES NFR BLD: 1 % (ref 4–15)
NEUTROPHILS NFR BLD: 89 % (ref 38–73)
NEUTS BAND NFR BLD MANUAL: 6 %
OB PNL STL: NEGATIVE
PHOSPHATE SERPL-MCNC: 2.9 MG/DL (ref 2.7–4.5)
PLATELET # BLD AUTO: 159 K/UL (ref 150–350)
PLATELET BLD QL SMEAR: ABNORMAL
PMV BLD AUTO: 10.4 FL (ref 9.2–12.9)
POCT GLUCOSE: 133 MG/DL (ref 70–110)
POCT GLUCOSE: 74 MG/DL (ref 70–110)
POCT GLUCOSE: 75 MG/DL (ref 70–110)
POCT GLUCOSE: 76 MG/DL (ref 70–110)
POCT GLUCOSE: 88 MG/DL (ref 70–110)
POCT GLUCOSE: 96 MG/DL (ref 70–110)
POTASSIUM SERPL-SCNC: 3.2 MMOL/L (ref 3.5–5.1)
PROT SERPL-MCNC: 5 G/DL (ref 6–8.4)
RBC # BLD AUTO: 3.31 M/UL (ref 4–5.4)
SODIUM SERPL-SCNC: 138 MMOL/L (ref 136–145)
WBC # BLD AUTO: 12.94 K/UL (ref 3.9–12.7)

## 2019-11-05 PROCEDURE — 99223 PR INITIAL HOSPITAL CARE,LEVL III: ICD-10-PCS | Mod: ,,, | Performed by: NURSE PRACTITIONER

## 2019-11-05 PROCEDURE — 63600175 PHARM REV CODE 636 W HCPCS: Performed by: STUDENT IN AN ORGANIZED HEALTH CARE EDUCATION/TRAINING PROGRAM

## 2019-11-05 PROCEDURE — 36415 COLL VENOUS BLD VENIPUNCTURE: CPT

## 2019-11-05 PROCEDURE — 92526 ORAL FUNCTION THERAPY: CPT

## 2019-11-05 PROCEDURE — 85007 BL SMEAR W/DIFF WBC COUNT: CPT

## 2019-11-05 PROCEDURE — 27000221 HC OXYGEN, UP TO 24 HOURS

## 2019-11-05 PROCEDURE — 85027 COMPLETE CBC AUTOMATED: CPT

## 2019-11-05 PROCEDURE — 82272 OCCULT BLD FECES 1-3 TESTS: CPT

## 2019-11-05 PROCEDURE — 83735 ASSAY OF MAGNESIUM: CPT

## 2019-11-05 PROCEDURE — 99223 1ST HOSP IP/OBS HIGH 75: CPT | Mod: ,,, | Performed by: NURSE PRACTITIONER

## 2019-11-05 PROCEDURE — 25000003 PHARM REV CODE 250: Performed by: STUDENT IN AN ORGANIZED HEALTH CARE EDUCATION/TRAINING PROGRAM

## 2019-11-05 PROCEDURE — 84100 ASSAY OF PHOSPHORUS: CPT

## 2019-11-05 PROCEDURE — 80053 COMPREHEN METABOLIC PANEL: CPT

## 2019-11-05 PROCEDURE — 94761 N-INVAS EAR/PLS OXIMETRY MLT: CPT

## 2019-11-05 PROCEDURE — 63600175 PHARM REV CODE 636 W HCPCS: Performed by: INTERNAL MEDICINE

## 2019-11-05 RX ORDER — MAGNESIUM SULFATE HEPTAHYDRATE 40 MG/ML
2 INJECTION, SOLUTION INTRAVENOUS ONCE
Status: COMPLETED | OUTPATIENT
Start: 2019-11-05 | End: 2019-11-05

## 2019-11-05 RX ORDER — CIPROFLOXACIN 500 MG/5ML
500 KIT ORAL 2 TIMES DAILY
Qty: 100 ML | Refills: 0 | Status: SHIPPED | OUTPATIENT
Start: 2019-11-05 | End: 2019-11-17

## 2019-11-05 RX ORDER — POTASSIUM CHLORIDE 20 MEQ/15ML
20 SOLUTION ORAL
Status: DISCONTINUED | OUTPATIENT
Start: 2019-11-05 | End: 2019-11-05

## 2019-11-05 RX ORDER — CIPROFLOXACIN 500 MG/1
500 TABLET ORAL EVERY 12 HOURS
Status: DISCONTINUED | OUTPATIENT
Start: 2019-11-05 | End: 2019-11-05

## 2019-11-05 RX ORDER — POTASSIUM CHLORIDE 20 MEQ/15ML
40 SOLUTION ORAL ONCE
Status: DISCONTINUED | OUTPATIENT
Start: 2019-11-05 | End: 2019-11-05 | Stop reason: HOSPADM

## 2019-11-05 RX ORDER — CIPROFLOXACIN 500 MG/5ML
500 KIT ORAL 2 TIMES DAILY
Qty: 100 ML | Refills: 0 | Status: SHIPPED | OUTPATIENT
Start: 2019-11-05 | End: 2019-11-05 | Stop reason: SDUPTHER

## 2019-11-05 RX ADMIN — CEFEPIME 2 G: 2 INJECTION, POWDER, FOR SOLUTION INTRAVENOUS at 09:11

## 2019-11-05 RX ADMIN — MAGNESIUM SULFATE IN WATER 2 G: 40 INJECTION, SOLUTION INTRAVENOUS at 03:11

## 2019-11-05 RX ADMIN — HEPARIN SODIUM 5000 UNITS: 5000 INJECTION, SOLUTION INTRAVENOUS; SUBCUTANEOUS at 03:11

## 2019-11-05 RX ADMIN — SODIUM CHLORIDE, SODIUM LACTATE, POTASSIUM CHLORIDE, AND CALCIUM CHLORIDE: .6; .31; .03; .02 INJECTION, SOLUTION INTRAVENOUS at 03:11

## 2019-11-05 RX ADMIN — HEPARIN SODIUM 5000 UNITS: 5000 INJECTION, SOLUTION INTRAVENOUS; SUBCUTANEOUS at 06:11

## 2019-11-05 RX ADMIN — COLLAGENASE SANTYL: 250 OINTMENT TOPICAL at 02:11

## 2019-11-05 RX ADMIN — DEXTROSE 125 ML: 10 SOLUTION INTRAVENOUS at 07:11

## 2019-11-05 NOTE — DISCHARGE SUMMARY
[facility] Discharge Summary    Primary Team: [facility] Medicine A  Attending Physician: Suman Lord MD  Resident: Roman  Intern: Venkat    Date of Admit: 11/2/2019  Date of Discharge: 11/5/2019    Discharge to: home  Condition: stable    Discharge Diagnoses     Patient Active Problem List   Diagnosis    Dementia    GERD (gastroesophageal reflux disease)    Hypertension    Depression    Syncope    Septic shock    Nephrolithiasis    Acute unilateral obstructive uropathy    Complicated UTI (urinary tract infection)    ANA (acute kidney injury)    High anion gap metabolic acidosis    Hyperbilirubinemia    Elevated troponin    Subclinical hypothyroidism    Thoracic aortic aneurysm    Severe sepsis    Kidney stone on left side    Tachycardia    Pressure injury of coccygeal region, stage 3    Pressure injury of right heel, unstageable    Palliative care encounter    Counseling regarding advance care planning and goals of care       Consultants and Procedures     Consultants:  Urology     Procedures:   None    Brief History of Present Illness      Moira Salter is a 89 y.o. with severe dementia (bed bound, without any idea who family members are , mumbles insensibly most of the time), CKD and GERD who presents from home with increased lethargy and poor oral intake for a day     The patient was in their usual state of health--bed bound with very poor quality of life--until yesterday when her family noticed that she wasn't really eating as much as usual and seemed more sleepy than usual. She was noted to have an irregular breathing pattern as well but no recent cough or shortness of breath was noted. As far as family was able to report, she had been herself until yesterday. EMS was called and she was found to be tachycardic hypotensive and she was emergently brought to the ED. She is unable to provide any meaningful history.     During stay patient's BP improved with fluids and her lactic acidosis resolved.  Urology was consulted but they did not feel that an intervention was warranted for a large obstructive calculi considering her age and co-morbidities. She was treated with broad spectrum antibiotics and then de-escalated to gram negative coverage only as cultures resulted. Plan to go home with oral suspension of cipro if patient can tolerate it but family wanted to try.     Hospital Course By Problem with Pertinent Findings     Septic Shock 2/2 acute complicated UTI with unilateral obstructive uropathy with gram negative bacteremia  - 89F (bedbound, severe dementia) presents with one day of poor PO intake and lethargy, noticed to be cold and have abnormal respiratory pattern by family  - febrile to 103 in ED, with tachycardia and hypotension, without leukocytosis but lactic of 11 and procal 9.29 and evidence of end organ damage (elevated trop and ANA); UA suggestive of infection and fede pus noted in reyes   - CT chest/abdo/pelvis with large (15 x 7 mm) renal calculus + mild hydronephrosis on L  - has sacral decub (stage 2-3) though less likely source  - s/p sepsis bolus, vanc, rocephin and flagyl in ED with improvement in BP  - plan for continued vanc and flagyl with exchange of rocephin for cefepime and non-emergent urology consult for source control in AM (were given option for transfer for emergent urologic procedure)  - blood cultures pending  - after discussion with family members and discussions among themselves, will change code status to DNR, will not admit patient to the ICU as they do not want invasive lines or vasopressors, if these were required  - urine cultures with e. coli and blood cultures with proteus and e.coli, urine pan-sensitive but blood cultures without sensitivies  - continued cefepime with plans to de-escalate to two weeks of cirpo but patient no longer able to swallow  - will try the oral suspension for 14 days from last culture but advised family that it was okay if she could not  complete the course     Elevated troponin  - presents 0.923 from normal baseline, suspect demand in setting of septic shock  - no ischemic EKG changes; will not trend as no potential   intervention     ANA on CKD III, resolved  - CR 1.6 from baseline 1.0  - baseline around 50, presents at 28   - suspect more related to volume depletion/sepsis than unilateral obstruction and expect improvement in morning labs after resucitation   - improving with fluids and normal at discharge     AGMA, resolved  - 2/2 lactic acidosis, will trend lactate, suspect improvement after fluid resuscitation     Hyperbilirubinemia  - 1.7 from normal baseline  - unclear etiology though ALP elevated from normal along with mild bump in AST  - doubt biliary obstruction, CT documents normal pancreas, liver and gallbladder absent  - if worsening, can consider US to check CBD but improved and normal at discharge     Stage II-III decubitus ulcer and unstageable ulcers to heels  - wound care consulted but will defer care to hospice after discharge     Aortic aneurysm, thoracic  - noted on CT, no intervention     Protein gap  - noted on labs, defer work up     Dementia, severe  - completely bed bound and without any concept of who her family members are anymore  - family aware of her poor quality of life and all were in agreement that a less aggressive strategy in optimal course  - home with hospice as above    Discharge Medications        Medication List      START taking these medications    ciprofloxacin 500 mg/5 mL suspension  Commonly known as:  CIPRO  Take 5 mLs (500 mg total) by mouth 2 (two) times daily. for 12 days     collagenase ointment  Commonly known as:  SANTYL  Apply topically every Tuesday, Thursday, and  Saturday .        CONTINUE taking these medications    polyethylene glycol 17 gram/dose powder  Commonly known as:  GLYCOLAX  Take 17 g by mouth once daily.        STOP taking these medications    loratadine 10 mg tablet  Commonly  known as:  CLARITIN     omeprazole 40 MG capsule  Commonly known as:  PriLOSEC     tobramycin sulfate 0.3% 0.3 % ophthalmic solution  Commonly known as:  TOBREX           Where to Get Your Medications      These medications were sent to [facility]  [address] [place] [address]    Hours:  Mon-Fri, 8a-5:30p Phone:  [phone]   · collagenase ointment     These medications were sent to [id number] - [place] - [address] [facility]  [address] [address]    Phone:  [phone]   · ciprofloxacin 500 mg/5 mL suspension         Discharge Information:   Diet:  NPO but pleasure feeds if tolerated, per hospice    Physical Activity:  Per hospice, bedbound    Instructions:  1. Take all medications as prescribed  2. Keep all follow-up appointments  3. Return to the hospital or call your primary care physicians if any worsening symptoms  occur.      Follow-Up Appointments:  Per hospice    Cyril Owens  [facility] Internal Medicine, HO-III

## 2019-11-05 NOTE — NURSING
Notified Dr. Wade of pt 6 beat run of vtach and asymptomatic. No new orders received.        11/05/19 0300   Vital Signs   Temp 97.5 °F (36.4 °C)   Temp src Axillary   Pulse 78   Heart Rate Source Monitor   Resp 17   SpO2 99 %   Pulse Oximetry Type Intermittent   Flow (L/min) 1   O2 Device (Oxygen Therapy) nasal cannula   /85   BP Location Right arm   Patient Position Lying

## 2019-11-05 NOTE — PROGRESS NOTES
[place] called [facility] to follow up with patient's transportation. Per [facility] that [facility] transport is in route to  the patient. Patient daughter is aware. TN contacted [facility] on call service to notify of delay in transportation. On call Nurse to contact daughter.

## 2019-11-05 NOTE — PLAN OF CARE
Pt. Had a 6 beat run of vtach at 3:00 a.m. November 5. Pt vitals were stable and pt was asymptomatic. Pt was given scheduled heparin. Pt vitals are stable and safety maintained.

## 2019-11-05 NOTE — PT/OT/SLP PROGRESS
Speech Language Pathology Treatment    Patient Name:  Moira Salter   MRN:  [id number]  Admitting Diagnosis: Septic shock    Recommendations:                 General Recommendations:  Palliative care discussion ongoing, Pleasure feed diet  Diet recommendations:  Puree, Pleasure Feeding, Liquid Diet Level: Thin   Aspiration Precautions: pt should be alert and awake when consuming any PO, small tsp or single swallows of liquid, watch that PO is swallowed before next bite, watch for coughing/choking    General Precautions: Standard, fall, respiratory, aspiration(Egyptian speaking)  Communication strategies:  Pt is Egyptian speaking, remains non verbal at this time    Subjective     Pt seen for follow-up. Dtrs in room attempting to feed pt.   Patient goals: none stated, they are waiting on Palliative care meeting.      Pain/Comfort:  · Pain Rating 1: (cannot state pain level)    Objective:     Has the patient been evaluated by SLP for swallowing?   Yes  Keep patient NPO? No   Current Respiratory Status: nasal cannula      Swallowing: Pt with dcr'd wakefulness, dtr is placing wet towel on head and continues to tap shoulder and perform sternal rub to stimulate her to allow and open mouth for PO trials. Pt accepting very limited amts of PO (jello less than 1/4 tsp and applesauce less than 1/4 tsp, water given by syringe sips resulting in buccal spillage. Pt with initial holding on all PO trials, needs continued jaw stimulation to start oral prep phase. Dtr checking oral cavity after each presentation. Family wants to continue to feed pt despite aspiration risk.   Educated dtr on aspiration risks and safe feeding instructions provided. She v/u understanding.   They are awaiting Palliative care meeting.     Assessment:     Moira Salter is a 89 y.o. female with an SLP diagnosis of failure to thrive, dysphagia s/s and risk for malnutrition.      Goals:   Multidisciplinary Problems     SLP Goals        Problem: SLP Goal    Goal  Priority Disciplines Outcome   SLP Goal     SLP Ongoing, Not Progressing   Description:  Short Term Goals:  1. Pt will participate in clinical swallow assessment to determine LRD.-met 11/4  2. Pt will consume Puree/Thin liquids for pleasure without any overt s/s of aspiration.                     Plan:     · Patient to be seen:  2 x/week   · Plan of Care expires:  12/03/19  · Plan of Care reviewed with:  patient, daughter, caregiver   · SLP Follow-Up:  No       Discharge recommendations:  (palliatve care discussion, needs 24 hour care)     Time Tracking:     SLP Treatment Date:   11/05/19  Speech Start Time:  0930  Speech Stop Time:  0941     Speech Total Time (min):  11 min    Billable Minutes: Treatment Swallowing Dysfunction 11        HANNA Gamboa, CCC-SLP  11/05/2019

## 2019-11-05 NOTE — PLAN OF CARE
Patient will be discharged home with hospice care. [facility] will be the agency assigned to the patient per family. Referral has been sent and reviewed by Susan at [facility]. [facility] transport has been arranged to  patient at 4:30pm . Family and beside nurse aware.       11/05/19 1622   Final Note   Assessment Type Final Discharge Note   Anticipated Discharge Disposition [facility]   What phone number can be called within the next 1-3 days to see how you are doing after discharge? [phone]   Hospital Follow Up  Appt(s) scheduled? Yes   Discharge plans and expectations educations in teach back method with documentation complete? Yes   Right Care Referral Info   Post Acute Recommendation SNF / Sub-Acute Rehab   Referral Type Hospice   Facility Name  [facility]

## 2019-11-05 NOTE — PLAN OF CARE
Pt seen in room, dtr has to syringe liquids in oral cavity by pulling back cheek. Pt took less than 1/4 bite of jello and applesauce off tsp. Pt with dcr'd labial stripping motion when spoon presented to her. Pt does not open eyes during all PO attempts. REC: Palliative care discussion with family to keep her comfortable. Family still wants to offer her pureed tray.   Problem: SLP Goal  Goal: SLP Goal  Description  Short Term Goals:  1. Pt will participate in clinical swallow assessment to determine LRD.-met 11/4  2. Pt will consume Puree/Thin liquids for pleasure without any overt s/s of aspiration.    Outcome: Ongoing, Not Progressing

## 2019-11-05 NOTE — PROGRESS NOTES
[facility] IM Resident  Progress Note    Subjective:      Remained comfortable appearing overnight per family report.     Objective:   Last 24 Hour Vital Signs:  BP  Min: 103/62  Max: 137/85  Temp  Av.3 °F (36.3 °C)  Min: 96.7 °F (35.9 °C)  Max: 98.2 °F (36.8 °C)  Pulse  Av.6  Min: 64  Max: 94  Resp  Av.5  Min: 15  Max: 18  SpO2  Av.7 %  Min: 97 %  Max: 99 %  I/O last 3 completed shifts:  In: 4368.8 [I.V.:3968.8; IV Piggyback:400]  Out: 940 [Urine:940]    Physical Examination:  Gen: resting comfortably  HEENT: EOMi, oropharynx clear, dry and without exudate, JVP 6cm, no cervical lymphadenopathy   CVS: tachycardic, regular rhythm, normal S1/S2, without S3/S4 or murmurs  Resp: no use of accessory muscles, no wheezes, no rhonchi, no rales  Abdo: soft, non-tender, non-distended, bowel sounds +, without guarding or rebound; flank tenderness on L  Neuro: moves all extremities, no abnormal tone  Ext: no edema, no cyanosis  Skin: no rashes , sacral decubs noted (stage 2-3)       Laboratory:  Laboratory Data Reviewed: yes  Pertinent Findings:  Labs pending    Microbiology Data Reviewed: yes  Pertinent Findings:  Blood culture proteus, e. Coli, sensitivities pending  Urine culture e. Coli with sensitivities pending    Other Results:  EKG (my interpretation): None new    Radiology Data Reviewed: yes  Pertinent Findings:  None new    Current Medications:     Infusions:   lactated ringers Stopped (19 0643)        Scheduled:   ceFEPime (MAXIPIME) IVPB  2 g Intravenous Q24H    collagenase   Topical (Top) Every es, Thurs, Sat    heparin (porcine)  5,000 Units Subcutaneous Q8H    polyethylene glycol  17 g Oral Daily        PRN:  acetaminophen, Dextrose 10% Bolus, Dextrose 10% Bolus, glucagon (human recombinant), glucose, glucose, influenza    Antibiotics and Day Number of Therapy:  vanc (11/3-)  Cefepime(11/3-)  Flagyl (11/3- )    Lines and Day Number of Therapy:  PIV    Assessment:     Moira garcia a  89 y.o.female with  Patient Active Problem List    Diagnosis Date Noted    Pressure injury of coccygeal region, stage 3 11/04/2019    Pressure injury of right heel, unstageable 11/04/2019    Palliative care encounter 11/04/2019    Counseling regarding advance care planning and goals of care 11/04/2019    Septic shock 11/03/2019    Nephrolithiasis 11/03/2019    Acute unilateral obstructive uropathy 11/03/2019    Complicated UTI (urinary tract infection) 11/03/2019    ANA (acute kidney injury) 11/03/2019    High anion gap metabolic acidosis 11/03/2019    Hyperbilirubinemia 11/03/2019    Elevated troponin 11/03/2019    Subclinical hypothyroidism 11/03/2019    Thoracic aortic aneurysm 11/03/2019    Severe sepsis     Kidney stone on left side     Tachycardia     Syncope 01/03/2017    Dementia 07/24/2012    GERD (gastroesophageal reflux disease) 07/24/2012    Hypertension 07/24/2012    Depression 07/24/2012        Plan:     Septic Shock 2/2 acute complicated UTI with unilateral obstructive uropathy with gram negative bacteremia  - 89F (bedbound, severe dementia) presents with one day of poor PO intake and lethargy, noticed to be cold and have abnormal respiratory pattern by family  - febrile to 103 in ED, with tachycardia and hypotension, without leukocytosis but lactic of 11 and procal 9.29 and evidence of end organ damage (elevated trop and ANA); UA suggestive of infection and fede pus noted in reyes   - CT chest/abdo/pelvis with large (15 x 7 mm) renal calculus + mild hydronephrosis on L  - has sacral decub (stage 2-3) though less likely source  - s/p sepsis bolus, vanc, rocephin and flagyl in ED with improvement in BP  - plan for continued vanc and flagyl with exchange of rocephin for cefepime and non-emergent urology consult for source control in AM (were given option for transfer for emergent urologic procedure)  - blood cultures pending  - after discussion with family members and discussions  among themselves, will change code status to DNR, will not admit patient to the ICU as they do not want invasive lines or vasopressors, if these were required  - urine cultures with e. coli and blood cultures with proteus and e.coli, await sensitivites  - continue cefepime       Elevated troponin  - presents 0.923 from normal baseline, suspect demand in setting of septic shock  - no ischemic EKG changes; will not trend as no potential   intervention     ANA on CKD III, improving  - CR 1.6 from baseline 1.0  - baseline around 50, presents at 28   - suspect more related to volume depletion/sepsis than unilateral obstruction and expect improvement in morning labs after resucitation   - improving with fluids, await morning labs     AGMA, resolved  - 2/2 lactic acidosis, will trend lactate, suspect improvement after fluid resuscitation     Hyperbilirubinemia  - 1.7 from normal baseline  - unclear etiology though ALP elevated from normal along with mild bump in AST  - doubt biliary obstruction, CT documents normal pancreas, liver and gallbladder absent  - if worsening, can consider US to check CBD; await morning labs    Stage II-III decubitus ulcer and unstageable ulcers to heels  - wound care consult; appreciate recs     Aortic aneurysm, thoracic  - noted on CT     Protein gap  - noted on labs, defer work up     Dementia, severe  - completely bed bound and without any concept of who her family members are anymore  - family aware of her poor quality of life and all were in agreement that a less aggressive strategy in optimal course     Diet: pureed, pleasure feefds  Ppx: heparin  Dispo:  floor with antibiotics         Code Status:      DNR      Cyril Owens  [facility] Internal Medicine HO-III  [facility] Medicine Service Team A    [facility] Medicine Hospitalist Pager numbers:   [facility] Hospitalist Medicine Team A (Pop/Codey): [phone]  [facility] Hospitalist Medicine Team B (Caryn/Mae):  [phone]

## 2019-11-05 NOTE — PLAN OF CARE
[facility]    FACILITY TRANSFER ORDERS      Patient Name: Moira Salter  YOB: 1927    PCP: Forest Angeles MD   PCP Address: [address] [place] [address] / [place] [id number]  PCP Phone Number: [phone]  PCP Fax: [phone]    Encounter Date: 11/05/2019    [facility]     Vital Signs:  Routine    Diagnoses:   Active Hospital Problems    Diagnosis  POA    *Septic shock [A41.9, R65.21]  Yes    Pressure injury of coccygeal region, stage 3 [L89.153]  Yes    Pressure injury of right heel, unstageable [L89.610]  Yes    Palliative care encounter [Z51.5]  Not Applicable    Counseling regarding advance care planning and goals of care [Z71.89]  Not Applicable    Nephrolithiasis [N20.0]  Yes    Acute unilateral obstructive uropathy [N13.9]  Yes    Complicated UTI (urinary tract infection) [N39.0]  Yes    ANA (acute kidney injury) [N17.9]  Yes    High anion gap metabolic acidosis [E87.2]  Yes    Hyperbilirubinemia [E80.6]  Yes    Elevated troponin [R79.89]  Yes    Subclinical hypothyroidism [E03.9]  Yes    Thoracic aortic aneurysm [I71.2]  Yes    Severe sepsis [A41.9, R65.20]  Yes    Kidney stone on left side [N20.0]  Yes    Tachycardia [R00.0]  Yes    Dementia [F03.90]  Yes      Resolved Hospital Problems   No resolved problems to display.       Allergies:  Review of patient's allergies indicates:   Allergen Reactions    Penicillins      Other reaction(s): Rash       Diet: Puree, Pleasure Feeding, Liquid Diet Level: Thin     Activities: per hospice     Nursing: per hospice     Labs: none     CONSULTS:    None     MISCELLANEOUS CARE:  None     WOUND CARE ORDERS  Coccyx, Stage 3, julio cesar wound DTI. Wound bed red with 20% slough, small serosanguinously drainage on bandage, no odor.    Medications: Review discharge medications with patient and family and provide education.      Current Discharge Medication List      START taking these medications    Details    collagenase (SANTYL) ointment Apply topically every Tuesday, Thursday, and  Saturday .  Qty: 30 g, Refills: 0         CONTINUE these medications which have NOT CHANGED    Details   polyethylene glycol (GLYCOLAX) 17 gram/dose powder Take 17 g by mouth once daily.  Qty: 1700 g, Refills: 1    Associated Diagnoses: Constipation, unspecified constipation type         STOP taking these medications       loratadine (CLARITIN) 10 mg tablet Comments:   Reason for Stopping:         omeprazole (PRILOSEC) 40 MG capsule Comments:   Reason for Stopping:         tobramycin sulfate 0.3% (TOBREX) 0.3 % ophthalmic solution Comments:   Reason for Stopping:                    _________________________________  Nicki Perry MD  11/05/2019

## 2019-11-05 NOTE — MEDICAL/APP STUDENT
[facility] Medicine Progress Note    Primary Team: [facility] Hospitalist Team A  Attending Physician: Suman Lord MD  Resident: Roman  Intern: Venkat    Subjective:      Ms. Salter is a 89 y.o. Female with a PMHx of severe dementia, CKD, and GERD who presented from home on 11/3 with increased lethargy and poor oral intake for a day.      History from the patient is limited due to state of dementia. Patient usual state of health is bed-bound and non-communicable. Patient is accompanied by her daughter in the room who states that that she had an epsisode of vtach (6 beat run) where she appeared to be breathing heavily. Patient is resting and in NAD.      Objective:     Last 24 Hour Vital Signs:  BP  Min: 100/58  Max: 137/85  Temp  Av.4 °F (36.3 °C)  Min: 96.7 °F (35.9 °C)  Max: 98.2 °F (36.8 °C)  Pulse  Av.6  Min: 64  Max: 94  Resp  Av.7  Min: 15  Max: 18  SpO2  Av.7 %  Min: 97 %  Max: 99 %  I/O last 3 completed shifts:  In: 4368.8 [I.V.:3968.8; IV Piggyback:400]  Out: 940 [Urine:940]    Physical Examination:  Gen: awake, does not follow commands, mumbles insensibly   HEENT: PERRL, EOMi, oropharynx clear, dry and without exudate, JVP 6cm, no cervical lymphadenopathy   CVS: tachycardic, regular rhythm, normal S1/S2, without S3/S4 or murmurs  Resp: no use of accessory muscles, no wheezes, no rhonchi, no rales  Abdo: soft, non-tender, non-distended, bowel sounds +, without guarding or rebound; flank tenderness on L  Neuro: moves all extremities, no abnormal tone  Ext: no edema, no cyanosis  Skin: no rashes , sacral decubs noted (stage 2-3), ulcer of the left foot.        Current Medications:     Infusions:   lactated ringers Stopped (19 0643)        Scheduled:   ceFEPime (MAXIPIME) IVPB  2 g Intravenous Q24H    collagenase   Topical (Top) Every es, Thurs, Sat    heparin (porcine)  5,000 Units Subcutaneous Q8H    polyethylene glycol  17 g Oral Daily        PRN:  acetaminophen, Dextrose 10%  Bolus, Dextrose 10% Bolus, glucagon (human recombinant), glucose, glucose, influenza      Assessment:     Moira Salter is a 89 y.o.female with  Patient Active Problem List    Diagnosis Date Noted    Pressure injury of coccygeal region, stage 3 11/04/2019    Pressure injury of right heel, unstageable 11/04/2019    Palliative care encounter 11/04/2019    Counseling regarding advance care planning and goals of care 11/04/2019    Septic shock 11/03/2019    Nephrolithiasis 11/03/2019    Acute unilateral obstructive uropathy 11/03/2019    Complicated UTI (urinary tract infection) 11/03/2019    ANA (acute kidney injury) 11/03/2019    High anion gap metabolic acidosis 11/03/2019    Hyperbilirubinemia 11/03/2019    Elevated troponin 11/03/2019    Subclinical hypothyroidism 11/03/2019    Thoracic aortic aneurysm 11/03/2019    Severe sepsis     Kidney stone on left side     Tachycardia     Syncope 01/03/2017    Dementia 07/24/2012    GERD (gastroesophageal reflux disease) 07/24/2012    Hypertension 07/24/2012    Depression 07/24/2012        Plan:   Septic Shock 2/2 acute complicated UTI with unilateral obstructive uropathy  - 89F (bedbound, severe dementia) presented on 11/3/19 with one day of poor PO intake and lethargy, noticed to be cold and have abnormal respiratory pattern by family  - febrile to 103 in ED, with tachycardia and hypotension, without leukocytosis but lactic of 11 and procal 9.29 and evidence of end organ damage (elevated trop and ANA); UA suggestive of infection and fede pus noted in reyes   - CT chest/abdo/pelvis with large (15 x 7 mm) renal calculus + mild hydronephrosis on L  - has sacral decub (stage 2-3) though less likely source  - s/p sepsis bolus, vanc, rocephin and flagyl in ED with improvement in BP  - plan for continued vanc and flagyl with exchange of rocephin for cefepime and non-emergent urology consult for source control in AM (were given option for transfer for  emergent urologic procedure)  - blood cultures revealed anaerobic and aerobic gram negative rods   - urine culture: prelim result revealed E. Coli   - after discussion with family members and discussions among themselves, will change code status to DNR, will not admit patient to the ICU as they do not want invasive lines or vasopressors, if these were required  - leukocytosis 11/4 am-- 41.48      Elevated troponin  - presents 0.923 from normal baseline, suspect demand in setting of septic shock  - no ischemic EKG changes     ANA on CKD  - CR 1.6 from baseline 1.0  - baseline around 50, presents at 28   - suspect more related to volume depletion/sepsis than unilateral obstruction and expect improvement in morning labs after resucitation      AGMA  - 2/2 lactic acidosis, will trend lactate, suspect improvement after fluid resuscitation     Hypokalemia   - K 3.2 on 11/5  - replete with KCL IV (patient somnolent and not tolerating PO)     Hyperbilirubinemia  - 1.7 from normal baseline  - unclear etiology though ALP elevated from normal along with mild bump in AST  - doubt biliary obstruction, CT documents normal pancreas, liver and gallbladder absent  - if worsening, can consider US to check CBD    Stage II-III decubitus ulcer  - wound care consult      Iron Deficiency Anemia  - RBC 3.31, HgB 9.4, HcT 28.4  - ferritin 565, Iron 10, TIBC 114  - replete with iron supplements as tolerated    Aortic aneurysm, thoracic  - noted on CT     Protein gap  - noted on labs, defer work up     Dementia, severe  - completely bed bound and without any concept of who her family members are anymore  - family aware of her poor quality of life and all were in agreement that a less aggressive strategy in optimal course     Code: DNR   Diet: pureed, pleasure feeds   Ppx: heparin  Dispo: pending urology rolf larios with hospice      Paolaflori SalasGeoff [facility] PA-S2      [facility] Medicine Hospitalist Pager numbers:   [facility] Hospitalist Medicine Team A  (Pop/Codey): [phone]  [facility] Hospitalist Medicine Team B (Caryn/Mae):  [phone]

## 2019-11-06 LAB
BACTERIA BLD CULT: ABNORMAL

## 2019-11-06 NOTE — PLAN OF CARE
[facility] discharged [facility]. Family is in agreement with agency choice. Patient left unit via [facility].       11/06/19 0941   Final Note   Assessment Type Final Discharge Note   [facility]ated [facility]charge Disposition [facility]  ([facility])   What phone number can be called within the next 1-3 days to see how you are doing after discharge? [phone]   Hospital Follow Up  Appt(s) scheduled? No   Discharge plans and expectations educations in teach back method with documentation complete? Yes   Right Care Referral Info   Post Acute Recommendation SNF / Sub-Acute Rehab   Referral Type Hospice   [facility]        11/06/19 0941   Final Note   Assessment Type Final Discharge Note   Anticipated Discharge Disposition [facility]  ([facility])   What phone number can be called within the next 1-3 days to see how you are doing after discharge? [phone]   Hospital Follow Up  Appt(s) scheduled? No   Discharge plans and expectations educations in teach back method with documentation complete? Yes   Right Care Referral Info   Post Acute Recommendation SNF / Sub-Acute Rehab   Referral Type Hospice   [facility]

## 2019-11-07 NOTE — CONSULTS
"Consult Note  Palliative Care      Consult Requested By: Dr. Suman Lord  Reason for Consult:Goals of Care    SUBJECTIVE:     History of Present Illness:  Disease Process:Septic Shock 2/2 acute complicated UTI with unilateral obstructive uropathy    Moira Salter is a 89 y.o. with severe dementia (bed bound, without any idea who family members, mumbles insensibly most of the time), CKD and GERD who presents from home with increased lethargy and poor oral intake for a day     The patient was in their usual state of health--bed bound with very poor quality of life--until yesterday when her family noticed that she wasn't really eating as much as usual and seemed more sleepy than usual. She was noted to have an irregular breathing pattern as well but no recent cough or shortness of breath was noted. As far as family was able to report, she had been herself until yesterday. EMS was called and she was found to be tachycardic hypotensive and she was emergently brought to the ED. She is unable to provide any meaningful history.      Palliative medicine met with patient and family this day. Patient frail elderly women. She does not open her eyes. Family feeding patient water with syringe. Daughter tearfully stated, "I know she is at the end of her disease. I just can't accept it right now. It feels like my heart is going to give out. I love her so much. She is a good mother." Discussed patient current disease trajectory. Verbalized understanding. Educated family on hospice. [facility] nurse there to go over services. Family decision home with hospice.      Past Medical History:   Diagnosis Date    Arthritis     Dementia     Hypertension     Osteoporosis      Past Surgical History:   Procedure Laterality Date    ABDOMINAL SURGERY       Family History   Problem Relation Age of Onset    No Known Problems Mother     No Known Problems Father     No Known Problems Maternal Aunt     No Known Problems Maternal Uncle  "    No Known Problems Paternal Aunt     No Known Problems Paternal Uncle     No Known Problems Maternal Grandmother     No Known Problems Maternal Grandfather     No Known Problems Paternal Grandmother     No Known Problems Paternal Grandfather     Melanoma Neg Hx      Social History     Tobacco Use    Smoking status: Never Smoker    Smokeless tobacco: Never Used   Substance Use Topics    Alcohol use: No    Drug use: No       Mental Status: Dementia    ECOG Performance Status Grade: 4 - Completely disabled    Review of Systems:  Review of systems not obtained due to patient factors dementia.    OBJECTIVE:     Pain Assessment: No pain reported at this time    Decision-Making Capacity: Family answered questions, Patient unable to communicate due to disease severity/cognitive impairment    Advanced Directives:  Living Will: No  Do Not Resuscitate Status: No  Medical Power of : No  Registered Organ Donor: No    Living Arrangements: Lives with family    Psychosocial, Spiritual, Cultural:  Patient's most important priorities:  none    Patient's biggest concerns/fears:  none    Previous death/end of life care history:  none    Patient's goals/hopes:  none    ASSESSMENT/PLAN:   Recommendations:  Comfort care  Code status: DNR  Family discharge home with hospice  Thank you for the consult and the opportunity to participate in Ms. Salter's  care.       Julianne Mcneil, MSN, APRN, NP-C   Palliative Medicine   [facility]  [phone] or [phone]        >50% of 60  min visit spent in chart review, face to face discussion of goals of care with patient, family, symptom assessment, coordination of care and emotional support.

## 2019-11-09 LAB
BACTERIA BLD CULT: NORMAL
BACTERIA BLD CULT: NORMAL